# Patient Record
Sex: FEMALE | Race: BLACK OR AFRICAN AMERICAN | Employment: UNEMPLOYED | URBAN - METROPOLITAN AREA
[De-identification: names, ages, dates, MRNs, and addresses within clinical notes are randomized per-mention and may not be internally consistent; named-entity substitution may affect disease eponyms.]

---

## 2020-02-13 ENCOUNTER — OFFICE VISIT (OUTPATIENT)
Dept: FAMILY MEDICINE CLINIC | Age: 45
End: 2020-02-13
Payer: COMMERCIAL

## 2020-02-13 VITALS
SYSTOLIC BLOOD PRESSURE: 138 MMHG | HEIGHT: 62 IN | DIASTOLIC BLOOD PRESSURE: 88 MMHG | BODY MASS INDEX: 35.66 KG/M2 | WEIGHT: 193.8 LBS

## 2020-02-13 PROBLEM — I10 HYPERTENSION: Status: ACTIVE | Noted: 2020-02-13

## 2020-02-13 PROBLEM — K21.9 GERD (GASTROESOPHAGEAL REFLUX DISEASE): Status: ACTIVE | Noted: 2020-02-13

## 2020-02-13 PROCEDURE — 99204 OFFICE O/P NEW MOD 45 MIN: CPT | Performed by: FAMILY MEDICINE

## 2020-02-13 RX ORDER — PREDNISONE 1 MG/1
5 TABLET ORAL DAILY
COMMUNITY

## 2020-02-13 RX ORDER — DOXAZOSIN MESYLATE 1 MG/1
2 TABLET ORAL 2 TIMES DAILY
COMMUNITY

## 2020-02-13 RX ORDER — CLONIDINE HYDROCHLORIDE 0.1 MG/1
0.1 TABLET ORAL 2 TIMES DAILY
COMMUNITY

## 2020-02-13 RX ORDER — EVEROLIMUS TABLETS 0.5 MG/1
TABLET ORAL
COMMUNITY
End: 2021-08-06 | Stop reason: ALTCHOICE

## 2020-02-13 ASSESSMENT — PATIENT HEALTH QUESTIONNAIRE - PHQ9
SUM OF ALL RESPONSES TO PHQ9 QUESTIONS 1 & 2: 0
SUM OF ALL RESPONSES TO PHQ QUESTIONS 1-9: 0
1. LITTLE INTEREST OR PLEASURE IN DOING THINGS: 0
SUM OF ALL RESPONSES TO PHQ QUESTIONS 1-9: 0
2. FEELING DOWN, DEPRESSED OR HOPELESS: 0

## 2020-02-13 ASSESSMENT — ENCOUNTER SYMPTOMS
DIARRHEA: 0
VOMITING: 0
COUGH: 0
CHEST TIGHTNESS: 0
ABDOMINAL PAIN: 0
NAUSEA: 0
WHEEZING: 0
BACK PAIN: 0
SORE THROAT: 0
RHINORRHEA: 0
SINUS PRESSURE: 0
SHORTNESS OF BREATH: 0

## 2020-02-13 NOTE — PROGRESS NOTES
2020     Doc Pina (:  1975) is a 50 y.o. female, here for evaluation of the following medical concerns:    1. Encounter to [facility]- patient is doing well, wants to establish care with pcp, has had a primary care doctor in the past, follows with her Nephrologist who prescribes her medications. She is not needing refills today. 2.  HTN- santhosh today, but patient has chronic renal disease, she is generally high, denied syncope, dizziness, or headache. Taking her medications as prescribed. 3.  Chronic kidney disease- recent labs obtained by her Nephrologist show creatinine over 7? She was informed that if this doesn't improve she will be placed back on dialysis. She has an upcoming appointment, feels well today. 4.  S/P renal transplant- she underwent procedure in  and thus has always followed with her Nephrologist, was encouraged to obtain a PCP due to other issues that could arise. 5.  GERD- well controlled with medication, no side effects from the medication, stated that she has been on this for several years, had chest pain but resolved once she took the medication. Today, denied chest pain, sob, n, v, or diarrhea. HPI    Review of Systems   Constitutional: Positive for activity change and fatigue. Negative for fever and unexpected weight change. HENT: Negative for congestion, ear pain, mouth sores, rhinorrhea, sinus pressure and sore throat. Eyes: Negative for visual disturbance. Respiratory: Negative for cough, chest tightness, shortness of breath and wheezing. Cardiovascular: Negative for chest pain and leg swelling. Gastrointestinal: Negative for abdominal pain, diarrhea, nausea and vomiting. Endocrine: Negative for cold intolerance, heat intolerance, polydipsia and polyphagia. Genitourinary: Negative for dyspareunia, flank pain, frequency, hematuria and urgency. Musculoskeletal: Positive for arthralgias. Negative for back pain. Skin: Negative for rash. Neurological: Negative for dizziness, tremors, syncope, numbness and headaches. Psychiatric/Behavioral: Negative for dysphoric mood. The patient is not nervous/anxious. Prior to Visit Medications    Medication Sig Taking? Authorizing Provider   cloNIDine (CATAPRES) 0.1 MG tablet Take 0.1 mg by mouth 3 times daily Yes Historical Provider, MD   predniSONE (DELTASONE) 5 MG tablet Take 5 mg by mouth daily Yes Historical Provider, MD   Ergocalciferol (VITAMIN D2 PO) Take by mouth Yes Historical Provider, MD   doxazosin (CARDURA) 1 MG tablet Take 1 mg by mouth nightly Yes Historical Provider, MD   Everolimus (ZORTRESS) 0.5 MG TABS Take by mouth Yes Historical Provider, MD   tacrolimus (PROGRAF) 1 MG capsule Take 1 mg by mouth 2 times daily Indications: two in the AM and three at night Yes Historical Provider, MD   Magnesium Cl-Calcium Carbonate (SLOW-MAG PO) Take 1 tablet by mouth daily Yes Historical Provider, MD   Cyanocobalamin (VITAMIN B 12 PO) Take 1 tablet by mouth daily Yes Historical Provider, MD   vitamin D (CHOLECALCIFEROL) 1000 UNIT TABS tablet Take 1,000 Units by mouth once a week Indications: on thursday Yes Historical Provider, MD   METOPROLOL SUCCINATE ER PO Take 1 tablet by mouth 2 times daily Yes Historical Provider, MD   FAMOTIDINE PO Take 1 tablet by mouth 2 times daily Yes Historical Provider, MD   SODIUM BICARBONATE, ANTACID, PO Take 2 tablets by mouth nightly Yes Historical Provider, MD        Social History     Tobacco Use    Smoking status: Never Smoker    Smokeless tobacco: Never Used   Substance Use Topics    Alcohol use: No        Vitals:    02/13/20 1102 02/13/20 1133   BP: (!) 142/92 138/88   Weight: 193 lb 12.8 oz (87.9 kg)    Height: 5' 2\" (1.575 m)      Estimated body mass index is 35.45 kg/m² as calculated from the following:    Height as of this encounter: 5' 2\" (1.575 m). Weight as of this encounter: 193 lb 12.8 oz (87.9 kg).     Physical

## 2021-07-16 ENCOUNTER — INITIAL CONSULT (OUTPATIENT)
Dept: SURGERY | Age: 46
End: 2021-07-16
Payer: COMMERCIAL

## 2021-07-16 VITALS — SYSTOLIC BLOOD PRESSURE: 100 MMHG | BODY MASS INDEX: 32.74 KG/M2 | WEIGHT: 179 LBS | DIASTOLIC BLOOD PRESSURE: 79 MMHG

## 2021-07-16 DIAGNOSIS — N18.6 ESRD (END STAGE RENAL DISEASE) ON DIALYSIS (HCC): Primary | ICD-10-CM

## 2021-07-16 DIAGNOSIS — Z99.2 ESRD (END STAGE RENAL DISEASE) ON DIALYSIS (HCC): Primary | ICD-10-CM

## 2021-07-16 PROBLEM — H02.883 MEIBOMIAN GLAND DYSFUNCTION OF RIGHT EYE: Status: ACTIVE | Noted: 2019-05-13

## 2021-07-16 PROBLEM — H11.153 PINGUECULA, BILATERAL: Status: ACTIVE | Noted: 2020-05-01

## 2021-07-16 PROBLEM — N18.5 ANEMIA DUE TO STAGE 5 CHRONIC KIDNEY DISEASE, NOT ON CHRONIC DIALYSIS (HCC): Status: ACTIVE | Noted: 2018-09-17

## 2021-07-16 PROBLEM — Z98.890 S/P ENDOMETRIAL ABLATION: Status: ACTIVE | Noted: 2019-04-23

## 2021-07-16 PROBLEM — G40.909 NON-REFRACTORY EPILEPSY (HCC): Status: ACTIVE | Noted: 2017-09-19

## 2021-07-16 PROBLEM — Z98.41 STATUS POST CATARACT EXTRACTION AND INSERTION OF INTRAOCULAR LENS OF RIGHT EYE: Status: ACTIVE | Noted: 2020-05-01

## 2021-07-16 PROBLEM — Z96.1 PSEUDOPHAKIA: Status: ACTIVE | Noted: 2020-05-01

## 2021-07-16 PROBLEM — D63.1 ANEMIA DUE TO CHRONIC KIDNEY DISEASE, ON CHRONIC DIALYSIS (HCC): Status: ACTIVE | Noted: 2020-01-09

## 2021-07-16 PROBLEM — E55.9 VITAMIN D DEFICIENCY: Status: ACTIVE | Noted: 2019-07-14

## 2021-07-16 PROBLEM — D75.829 HEPARIN-INDUCED THROMBOCYTOPENIA: Status: ACTIVE | Noted: 2018-09-26

## 2021-07-16 PROBLEM — H04.163: Status: ACTIVE | Noted: 2020-05-01

## 2021-07-16 PROBLEM — D64.9 ANEMIA, UNSPECIFIED: Status: ACTIVE | Noted: 2020-01-09

## 2021-07-16 PROBLEM — D63.1 ANEMIA SECONDARY TO RENAL FAILURE: Status: ACTIVE | Noted: 2020-01-09

## 2021-07-16 PROBLEM — N87.9 CERVICAL DYSPLASIA: Status: ACTIVE | Noted: 2017-05-24

## 2021-07-16 PROBLEM — H25.013 CORTICAL AGE-RELATED CATARACT, BILATERAL: Status: ACTIVE | Noted: 2020-05-01

## 2021-07-16 PROBLEM — Z00.00 ROUTINE ADULT HEALTH MAINTENANCE: Status: ACTIVE | Noted: 2020-09-06

## 2021-07-16 PROBLEM — D63.1 ANEMIA DUE TO STAGE 5 CHRONIC KIDNEY DISEASE, NOT ON CHRONIC DIALYSIS (HCC): Status: ACTIVE | Noted: 2018-09-17

## 2021-07-16 PROBLEM — T82.9XXA COMPLICATION OF VASCULAR ACCESS FOR DIALYSIS: Status: ACTIVE | Noted: 2017-03-23

## 2021-07-16 PROBLEM — E78.5 HYPERLIPEMIA: Status: ACTIVE | Noted: 2020-09-06

## 2021-07-16 PROBLEM — I12.0 HYPERTENSIVE KIDNEY DISEASE WITH ESRD (END-STAGE RENAL DISEASE) (HCC): Status: ACTIVE | Noted: 2017-03-23

## 2021-07-16 PROBLEM — N17.9 ARF (ACUTE RENAL FAILURE) (HCC): Status: ACTIVE | Noted: 2018-10-22

## 2021-07-16 PROBLEM — Z96.1 PRESENCE OF INTRAOCULAR LENS: Status: ACTIVE | Noted: 2020-05-01

## 2021-07-16 PROBLEM — Z96.1 STATUS POST CATARACT EXTRACTION AND INSERTION OF INTRAOCULAR LENS OF RIGHT EYE: Status: ACTIVE | Noted: 2020-05-01

## 2021-07-16 PROBLEM — E66.01 SEVERE OBESITY (BMI 35.0-39.9) WITH COMORBIDITY (HCC): Status: ACTIVE | Noted: 2018-07-19

## 2021-07-16 PROBLEM — E87.20 ACIDOSIS: Status: ACTIVE | Noted: 2020-09-06

## 2021-07-16 PROBLEM — N18.9 ANEMIA SECONDARY TO RENAL FAILURE: Status: ACTIVE | Noted: 2020-01-09

## 2021-07-16 PROBLEM — H25.041 POSTERIOR SUBCAPSULAR POLAR AGE-RELATED CATARACT, RIGHT EYE: Status: ACTIVE | Noted: 2020-05-01

## 2021-07-16 PROCEDURE — G8427 DOCREV CUR MEDS BY ELIG CLIN: HCPCS | Performed by: SURGERY

## 2021-07-16 PROCEDURE — 99205 OFFICE O/P NEW HI 60 MIN: CPT | Performed by: SURGERY

## 2021-07-16 PROCEDURE — G8417 CALC BMI ABV UP PARAM F/U: HCPCS | Performed by: SURGERY

## 2021-07-16 ASSESSMENT — ENCOUNTER SYMPTOMS
RESPIRATORY NEGATIVE: 1
GASTROINTESTINAL NEGATIVE: 1
ALLERGIC/IMMUNOLOGIC NEGATIVE: 1
EYES NEGATIVE: 1

## 2021-07-16 NOTE — PROGRESS NOTES
normal.       Musculoskeletal:         General: Normal range of motion. Arms:       Cervical back: Normal range of motion. Neurological:      Mental Status: She is alert and oriented to person, place, and time. Deep Tendon Reflexes: Reflexes are normal and symmetric. Psychiatric:         Speech: Speech normal.         Behavior: Behavior normal.         Thought Content: Thought content normal.         Judgment: Judgment normal.       She has had two children, one living who is 21 her older child had autism and has since . She has had a kidney transplant. She has a family history of MS    ASSESSMENT:    Problem List Items Addressed This Visit     None      Visit Diagnoses     ESRD (end stage renal disease) on dialysis ([facility]    -  Primary          PLAN:  She definitely wants to stay with the right arm has not even had the left arm mapped as she is left-handed she got many challenges for access had a fistula at the wrist became aneurysmal and was ligated but by the scar suspected that it was superficial lysed at some time. As far as her arteries the brachial artery has a high bifurcation at the elbow the radial is 3.7 the ulnar 4.4 it is deeper, she has a normal Anthony test.  As far as her veins there is a clot in the basilic no forearm veins at all the cephalic vein is across his antecubital space is small but there is an accessory cephalic vein that is large 6 mm it joins the main cephalic vein but senior care up the arm and  goes on to connect to the axillary system. Dr Jocelyne Moralez has recommended skipping the AV fistula at the elbow and moving on to a graft ulnar artery at the elbow to mid or higher brachial or basilic.   We had a long discussion about preference to use all natural versus graft I think at dialysis they showed her a piece of graft,Dr Garcia's objection is that the accessory cephalic vein is very superficial, but I think if we try the fistula first do not burn any bridges where his AV go with an upper arm [place]-J[place] Manuel as her first operation will be moving to the left arm shortly. There are other challenges that she is heparin allergic with a history of HIT so I have to use argatroban, and that she is on 2-1/2 mg Eliquis twice a day which will have to stop a couple days before the operation. We will have to remind everyone no heparin flushes no heparin on the back table. I also explained to the patient that we will look ourselves with the ultrasound and if we see any problems interval changes we may switch to the [place]-Juan Manule  You will need a right arm accessory cephalic to antecubital ulnar artery fistula. Tentative surgery date is Thursday, August 12. You will need to have an history and physical done by your primary care doctor before surgery can be done. Surgery is outpatient, and you will be called before surgery with a time of arrival and time of surgery. Ricky German MA am scribing for and in the presence of Dolores Butler MD on this date of 07/16/21    I Michelle Ward MD personally performed the services described in this documentation as scribed by the Medical Assistant Breezy Thomas in my presence and it is both accurate and complete.         Electronically signed by Dolores Butler MD on 7/16/2021 at 11:57 AM

## 2021-07-16 NOTE — PATIENT INSTRUCTIONS
You will need a right arm accessory cephalic to antecubital ulnar artery fistula. Tentative surgery date is Thursday, August 12. You will need to have an history and physical done by your primary care doctor before surgery can be done. Surgery is outpatient, and you will be called before surgery with a time of arrival and time of surgery.

## 2021-08-02 ENCOUNTER — TELEPHONE (OUTPATIENT)
Dept: SURGERY | Age: 46
End: 2021-08-02

## 2021-08-02 NOTE — TELEPHONE ENCOUNTER
Spoke with patient regarding scheduled surgery on 08- with Dr. Nazario Acuña. Patient is asked to arrive by 6:00 AM @ [facility] after midnight. May take any Heart or Blood Pressure medication with a small sip of water to wash them down. You will need someone to drive you home following this procedure. Please bring a Photo ID & Insurance card with you, and check-in at the Surgery Desk down the right-hand hallway on the first floor. Patient has been asked to see PCP (Or NP @ Dialysis)  for Pre-op H & P.  Surgery is scheduled to start at approx. 7:30 AM and should take approx. 120 minutes. If the hospital needs any further information, someone will give you a call. Patient expressed a verbal understanding of these instructions and had no further questions at this time. Mailed instruction letter. Call ended.

## 2021-08-06 RX ORDER — ACETAMINOPHEN 325 MG/1
650 TABLET ORAL EVERY 6 HOURS PRN
COMMUNITY

## 2021-08-06 NOTE — PROGRESS NOTES
nail polish on your fingers or toes      For your safety, please do not wear any jewelry or body piercing's on the day of surgery. All jewelry must be removed. If you have dentures, they will be removed before going to operating room. For your convenience, we will provide you with a container. If you wear contact lenses or glasses, they will be removed, please bring a case for them. If you have a living will and a durable power of  for healthcare, please bring in a copy. As part of our patient safety program to minimize surgical site infections, we ask you to do the following:    · Please notify your surgeon if you develop any illness between         now and the  day of your surgery. · This includes a cough, cold, fever, sore throat, nausea,         or vomiting, and diarrhea, etc.  ·  Please notify your surgeon if you experience dizziness, shortness         of breath or blurred vision between now and the time of your surgery. Do not shave your operative site 96 hours prior to surgery. For face and neck surgery, men may use an electric razor 48 hours   prior to surgery. You may shower the night before surgery or the morning of   your surgery with an antibacterial soap. You will need to bring a photo ID and insurance card    [facility] has an onsite pharmacy, would you like to utilize our pharmacy     If you will be staying overnight and use a C-pap machine, please bring   your C-pap to hospital     Our goal is to provide you with excellent care, therefore, visitors will be limited to two(2) in the room at a time so that we may focus on providing this care for you. Please contact pre-admission testing if you have any further questions.                  [facility] phone number:  [phone] PAT fax number:  [phone]  Please note these are generalized instructions for all surgical cases, you may be provided with more specific instructions according to your surgery.

## 2021-08-06 NOTE — PROGRESS NOTES
Preoperative Screening for Elective Surgery/Invasive Procedures While COVID-19 present in the community     Have you tested positive or have been told to self-isolate for COVID-19 like symptoms within the past 28 days? NO   Do you currently have any of the following symptoms? o Fever >100.0 F or 99.9 F in immunocompromised patients? NO  o New onset cough, shortness of breath or difficulty breathing? NO  o New onset sore throat, myalgia (muscle aches and pains), headache, loss of taste/smell or diarrhea? NO   Have you had a potential exposure to COVID-19 within the past 14 days by:  o Close contact with a confirmed case? NO  o Close contact with a healthcare worker,  or essential infrastructure worker (grocery store, [address] [place], [place] [address]way,[address], gas station, public utilities or transportation)? YES  o Do you reside in a congregate setting such as; skilled nursing facility, adult home, correctional facility, homeless shelter or other institutional setting? NO  o Have you had recent travel to a known COVID-19 hotspot? NO    Indicate if the patient has a positive screen by answering yes to one or more of the above questions. Patients who test positive or screen positive prior to surgery or on the day of surgery should be evaluated in conjunction with the surgeon/proceduralist/anesthesiologist to determine the urgency of the procedure.

## 2021-08-11 ENCOUNTER — ANESTHESIA EVENT (OUTPATIENT)
Dept: OPERATING ROOM | Age: 46
End: 2021-08-11
Payer: COMMERCIAL

## 2021-08-12 ENCOUNTER — ANESTHESIA (OUTPATIENT)
Dept: OPERATING ROOM | Age: 46
End: 2021-08-12
Payer: COMMERCIAL

## 2021-08-12 ENCOUNTER — HOSPITAL ENCOUNTER (OUTPATIENT)
Age: 46
Setting detail: OUTPATIENT SURGERY
Discharge: HOME | End: 2021-08-12
Attending: SURGERY | Admitting: SURGERY
Payer: COMMERCIAL

## 2021-08-12 VITALS
RESPIRATION RATE: 16 BRPM | WEIGHT: 181.22 LBS | HEART RATE: 59 BPM | TEMPERATURE: 97.8 F | SYSTOLIC BLOOD PRESSURE: 142 MMHG | HEIGHT: 63 IN | DIASTOLIC BLOOD PRESSURE: 93 MMHG | BODY MASS INDEX: 32.11 KG/M2 | OXYGEN SATURATION: 96 %

## 2021-08-12 VITALS
DIASTOLIC BLOOD PRESSURE: 81 MMHG | TEMPERATURE: 96.1 F | RESPIRATION RATE: 2 BRPM | SYSTOLIC BLOOD PRESSURE: 126 MMHG | OXYGEN SATURATION: 100 %

## 2021-08-12 DIAGNOSIS — G89.18 ACUTE POST-OPERATIVE PAIN: ICD-10-CM

## 2021-08-12 DIAGNOSIS — N18.5 CKD (CHRONIC KIDNEY DISEASE), STAGE V (HCC): Primary | ICD-10-CM

## 2021-08-12 LAB
BASOPHILS ABSOLUTE: 0 K/UL (ref 0–0.2)
BASOPHILS RELATIVE PERCENT: 0.6 %
EOSINOPHILS ABSOLUTE: 0.1 K/UL (ref 0–0.6)
EOSINOPHILS RELATIVE PERCENT: 2 %
HCT VFR BLD CALC: 36.7 % (ref 36–48)
HEMOGLOBIN: 11.8 G/DL (ref 12–16)
LYMPHOCYTES ABSOLUTE: 1.7 K/UL (ref 1–5.1)
LYMPHOCYTES RELATIVE PERCENT: 31 %
MCH RBC QN AUTO: 31.6 PG (ref 26–34)
MCHC RBC AUTO-ENTMCNC: 32.2 G/DL (ref 31–36)
MCV RBC AUTO: 98.1 FL (ref 80–100)
MONOCYTES ABSOLUTE: 0.6 K/UL (ref 0–1.3)
MONOCYTES RELATIVE PERCENT: 11.6 %
NEUTROPHILS ABSOLUTE: 3 K/UL (ref 1.7–7.7)
NEUTROPHILS RELATIVE PERCENT: 54.8 %
PDW BLD-RTO: 16.7 % (ref 12.4–15.4)
PLATELET # BLD: 122 K/UL (ref 135–450)
PMV BLD AUTO: 8.4 FL (ref 5–10.5)
POTASSIUM, WHOLE BLOOD: 5.5 MMOL/L (ref 3.5–5.1)
RBC # BLD: 3.74 M/UL (ref 4–5.2)
WBC # BLD: 5.5 K/UL (ref 4–11)

## 2021-08-12 PROCEDURE — 7100000010 HC PHASE II RECOVERY - FIRST 15 MIN: Performed by: SURGERY

## 2021-08-12 PROCEDURE — 2500000003 HC RX 250 WO HCPCS: Performed by: SURGERY

## 2021-08-12 PROCEDURE — 84132 ASSAY OF SERUM POTASSIUM: CPT

## 2021-08-12 PROCEDURE — 85025 COMPLETE CBC W/AUTO DIFF WBC: CPT

## 2021-08-12 PROCEDURE — 6370000000 HC RX 637 (ALT 250 FOR IP): Performed by: ANESTHESIOLOGY

## 2021-08-12 PROCEDURE — 3600000014 HC SURGERY LEVEL 4 ADDTL 15MIN: Performed by: SURGERY

## 2021-08-12 PROCEDURE — 2500000003 HC RX 250 WO HCPCS: Performed by: NURSE ANESTHETIST, CERTIFIED REGISTERED

## 2021-08-12 PROCEDURE — 6360000002 HC RX W HCPCS: Performed by: NURSE ANESTHETIST, CERTIFIED REGISTERED

## 2021-08-12 PROCEDURE — 2580000003 HC RX 258: Performed by: ANESTHESIOLOGY

## 2021-08-12 PROCEDURE — 2709999900 HC NON-CHARGEABLE SUPPLY: Performed by: SURGERY

## 2021-08-12 PROCEDURE — 2780000010 HC IMPLANT OTHER: Performed by: SURGERY

## 2021-08-12 PROCEDURE — 3700000000 HC ANESTHESIA ATTENDED CARE: Performed by: SURGERY

## 2021-08-12 PROCEDURE — 3600000004 HC SURGERY LEVEL 4 BASE: Performed by: SURGERY

## 2021-08-12 PROCEDURE — 2720000010 HC SURG SUPPLY STERILE: Performed by: SURGERY

## 2021-08-12 PROCEDURE — 7100000001 HC PACU RECOVERY - ADDTL 15 MIN: Performed by: SURGERY

## 2021-08-12 PROCEDURE — 6360000002 HC RX W HCPCS: Performed by: ANESTHESIOLOGY

## 2021-08-12 PROCEDURE — 2580000003 HC RX 258: Performed by: SURGERY

## 2021-08-12 PROCEDURE — 3700000001 HC ADD 15 MINUTES (ANESTHESIA): Performed by: SURGERY

## 2021-08-12 PROCEDURE — 7100000000 HC PACU RECOVERY - FIRST 15 MIN: Performed by: SURGERY

## 2021-08-12 PROCEDURE — 36825 ARTERY-VEIN AUTOGRAFT: CPT | Performed by: SURGERY

## 2021-08-12 PROCEDURE — 7100000011 HC PHASE II RECOVERY - ADDTL 15 MIN: Performed by: SURGERY

## 2021-08-12 RX ORDER — SODIUM CHLORIDE 0.9 % (FLUSH) 0.9 %
5-40 SYRINGE (ML) INJECTION PRN
Status: DISCONTINUED | OUTPATIENT
Start: 2021-08-12 | End: 2021-08-12 | Stop reason: HOSPADM

## 2021-08-12 RX ORDER — FENTANYL CITRATE 50 UG/ML
25 INJECTION, SOLUTION INTRAMUSCULAR; INTRAVENOUS EVERY 5 MIN PRN
Status: DISCONTINUED | OUTPATIENT
Start: 2021-08-12 | End: 2021-08-12 | Stop reason: HOSPADM

## 2021-08-12 RX ORDER — MAGNESIUM HYDROXIDE 1200 MG/15ML
LIQUID ORAL CONTINUOUS PRN
Status: COMPLETED | OUTPATIENT
Start: 2021-08-12 | End: 2021-08-12

## 2021-08-12 RX ORDER — SODIUM CHLORIDE 0.9 % (FLUSH) 0.9 %
5-40 SYRINGE (ML) INJECTION EVERY 12 HOURS SCHEDULED
Status: DISCONTINUED | OUTPATIENT
Start: 2021-08-12 | End: 2021-08-12 | Stop reason: HOSPADM

## 2021-08-12 RX ORDER — PROPOFOL 10 MG/ML
INJECTION, EMULSION INTRAVENOUS PRN
Status: DISCONTINUED | OUTPATIENT
Start: 2021-08-12 | End: 2021-08-12 | Stop reason: SDUPTHER

## 2021-08-12 RX ORDER — ONDANSETRON 2 MG/ML
4 INJECTION INTRAMUSCULAR; INTRAVENOUS
Status: DISCONTINUED | OUTPATIENT
Start: 2021-08-12 | End: 2021-08-12 | Stop reason: HOSPADM

## 2021-08-12 RX ORDER — CLINDAMYCIN PHOSPHATE 900 MG/50ML
900 INJECTION INTRAVENOUS ONCE
Status: COMPLETED | OUTPATIENT
Start: 2021-08-12 | End: 2021-08-12

## 2021-08-12 RX ORDER — ARGATROBAN 1 MG/ML
5 INJECTION, SOLUTION INTRAVENOUS ONCE
Status: COMPLETED | OUTPATIENT
Start: 2021-08-12 | End: 2021-08-12

## 2021-08-12 RX ORDER — FENTANYL CITRATE 50 UG/ML
50 INJECTION, SOLUTION INTRAMUSCULAR; INTRAVENOUS EVERY 5 MIN PRN
Status: DISCONTINUED | OUTPATIENT
Start: 2021-08-12 | End: 2021-08-12 | Stop reason: HOSPADM

## 2021-08-12 RX ORDER — OXYCODONE HYDROCHLORIDE 10 MG/1
10 TABLET ORAL PRN
Status: COMPLETED | OUTPATIENT
Start: 2021-08-12 | End: 2021-08-12

## 2021-08-12 RX ORDER — DEXAMETHASONE SODIUM PHOSPHATE 4 MG/ML
INJECTION, SOLUTION INTRA-ARTICULAR; INTRALESIONAL; INTRAMUSCULAR; INTRAVENOUS; SOFT TISSUE PRN
Status: DISCONTINUED | OUTPATIENT
Start: 2021-08-12 | End: 2021-08-12 | Stop reason: SDUPTHER

## 2021-08-12 RX ORDER — MIDAZOLAM HYDROCHLORIDE 1 MG/ML
INJECTION INTRAMUSCULAR; INTRAVENOUS PRN
Status: DISCONTINUED | OUTPATIENT
Start: 2021-08-12 | End: 2021-08-12 | Stop reason: SDUPTHER

## 2021-08-12 RX ORDER — MORPHINE SULFATE 2 MG/ML
1 INJECTION, SOLUTION INTRAMUSCULAR; INTRAVENOUS EVERY 5 MIN PRN
Status: DISCONTINUED | OUTPATIENT
Start: 2021-08-12 | End: 2021-08-12 | Stop reason: HOSPADM

## 2021-08-12 RX ORDER — LIDOCAINE HYDROCHLORIDE 20 MG/ML
INJECTION, SOLUTION EPIDURAL; INFILTRATION; INTRACAUDAL; PERINEURAL PRN
Status: DISCONTINUED | OUTPATIENT
Start: 2021-08-12 | End: 2021-08-12 | Stop reason: SDUPTHER

## 2021-08-12 RX ORDER — BUPIVACAINE HYDROCHLORIDE 5 MG/ML
INJECTION, SOLUTION EPIDURAL; INTRACAUDAL
Status: COMPLETED | OUTPATIENT
Start: 2021-08-12 | End: 2021-08-12

## 2021-08-12 RX ORDER — OXYCODONE HYDROCHLORIDE 5 MG/1
5 TABLET ORAL PRN
Status: COMPLETED | OUTPATIENT
Start: 2021-08-12 | End: 2021-08-12

## 2021-08-12 RX ORDER — SODIUM CHLORIDE 9 MG/ML
25 INJECTION, SOLUTION INTRAVENOUS PRN
Status: DISCONTINUED | OUTPATIENT
Start: 2021-08-12 | End: 2021-08-12 | Stop reason: HOSPADM

## 2021-08-12 RX ORDER — MEPERIDINE HYDROCHLORIDE 25 MG/ML
12.5 INJECTION INTRAMUSCULAR; INTRAVENOUS; SUBCUTANEOUS EVERY 5 MIN PRN
Status: DISCONTINUED | OUTPATIENT
Start: 2021-08-12 | End: 2021-08-12 | Stop reason: HOSPADM

## 2021-08-12 RX ORDER — MORPHINE SULFATE 2 MG/ML
2 INJECTION, SOLUTION INTRAMUSCULAR; INTRAVENOUS EVERY 5 MIN PRN
Status: DISCONTINUED | OUTPATIENT
Start: 2021-08-12 | End: 2021-08-12 | Stop reason: HOSPADM

## 2021-08-12 RX ORDER — FENTANYL CITRATE 50 UG/ML
INJECTION, SOLUTION INTRAMUSCULAR; INTRAVENOUS PRN
Status: DISCONTINUED | OUTPATIENT
Start: 2021-08-12 | End: 2021-08-12 | Stop reason: SDUPTHER

## 2021-08-12 RX ORDER — OXYCODONE HYDROCHLORIDE AND ACETAMINOPHEN 5; 325 MG/1; MG/1
1 TABLET ORAL EVERY 6 HOURS PRN
Qty: 28 TABLET | Refills: 0 | Status: SHIPPED | OUTPATIENT
Start: 2021-08-12 | End: 2021-08-19

## 2021-08-12 RX ADMIN — FENTANYL CITRATE 25 MCG: 50 INJECTION INTRAMUSCULAR; INTRAVENOUS at 08:35

## 2021-08-12 RX ADMIN — FENTANYL CITRATE 25 MCG: 50 INJECTION INTRAMUSCULAR; INTRAVENOUS at 08:04

## 2021-08-12 RX ADMIN — PROPOFOL 180 MG: 10 INJECTION, EMULSION INTRAVENOUS at 07:37

## 2021-08-12 RX ADMIN — CLINDAMYCIN PHOSPHATE 900 MG: 18 INJECTION, SOLUTION INTRAMUSCULAR; INTRAVENOUS at 07:30

## 2021-08-12 RX ADMIN — HYDROMORPHONE HYDROCHLORIDE 0.25 MG: 1 INJECTION, SOLUTION INTRAMUSCULAR; INTRAVENOUS; SUBCUTANEOUS at 09:17

## 2021-08-12 RX ADMIN — ARGATROBAN 5 MG: 1 INJECTION INTRAVENOUS at 08:30

## 2021-08-12 RX ADMIN — FENTANYL CITRATE 25 MCG: 50 INJECTION INTRAMUSCULAR; INTRAVENOUS at 08:44

## 2021-08-12 RX ADMIN — OXYCODONE HYDROCHLORIDE 5 MG: 5 TABLET ORAL at 11:34

## 2021-08-12 RX ADMIN — LIDOCAINE HYDROCHLORIDE 80 MG: 20 INJECTION, SOLUTION EPIDURAL; INFILTRATION; INTRACAUDAL; PERINEURAL at 07:37

## 2021-08-12 RX ADMIN — DEXAMETHASONE SODIUM PHOSPHATE 10 MG: 4 INJECTION, SOLUTION INTRAMUSCULAR; INTRAVENOUS at 08:04

## 2021-08-12 RX ADMIN — FENTANYL CITRATE 25 MCG: 50 INJECTION INTRAMUSCULAR; INTRAVENOUS at 08:53

## 2021-08-12 RX ADMIN — SODIUM CHLORIDE 25 ML: 9 INJECTION, SOLUTION INTRAVENOUS at 06:59

## 2021-08-12 RX ADMIN — MIDAZOLAM 2 MG: 1 INJECTION INTRAMUSCULAR; INTRAVENOUS at 07:30

## 2021-08-12 ASSESSMENT — PULMONARY FUNCTION TESTS
PIF_VALUE: 15
PIF_VALUE: 3
PIF_VALUE: 15
PIF_VALUE: 4
PIF_VALUE: 8
PIF_VALUE: 21
PIF_VALUE: 3
PIF_VALUE: 4
PIF_VALUE: 11
PIF_VALUE: 3
PIF_VALUE: 4
PIF_VALUE: 15
PIF_VALUE: 4
PIF_VALUE: 4
PIF_VALUE: 15
PIF_VALUE: 5
PIF_VALUE: 15
PIF_VALUE: 4
PIF_VALUE: 4
PIF_VALUE: 3
PIF_VALUE: 4
PIF_VALUE: 4
PIF_VALUE: 15
PIF_VALUE: 4
PIF_VALUE: 4
PIF_VALUE: 15
PIF_VALUE: 5
PIF_VALUE: 4
PIF_VALUE: 4
PIF_VALUE: 3
PIF_VALUE: 3
PIF_VALUE: 4
PIF_VALUE: 3
PIF_VALUE: 4
PIF_VALUE: 3
PIF_VALUE: 15
PIF_VALUE: 3
PIF_VALUE: 15
PIF_VALUE: 15
PIF_VALUE: 4
PIF_VALUE: 15
PIF_VALUE: 3
PIF_VALUE: 4
PIF_VALUE: 15
PIF_VALUE: 15
PIF_VALUE: 11
PIF_VALUE: 4
PIF_VALUE: 4
PIF_VALUE: 5
PIF_VALUE: 15
PIF_VALUE: 6
PIF_VALUE: 4
PIF_VALUE: 3
PIF_VALUE: 7
PIF_VALUE: 3
PIF_VALUE: 4
PIF_VALUE: 3
PIF_VALUE: 4
PIF_VALUE: 4
PIF_VALUE: 3
PIF_VALUE: 3
PIF_VALUE: 4
PIF_VALUE: 3
PIF_VALUE: 15
PIF_VALUE: 1
PIF_VALUE: 15
PIF_VALUE: 3
PIF_VALUE: 3
PIF_VALUE: 5
PIF_VALUE: 4
PIF_VALUE: 1
PIF_VALUE: 4
PIF_VALUE: 4
PIF_VALUE: 2
PIF_VALUE: 3
PIF_VALUE: 4
PIF_VALUE: 4
PIF_VALUE: 18
PIF_VALUE: 3
PIF_VALUE: 0
PIF_VALUE: 4
PIF_VALUE: 2
PIF_VALUE: 3
PIF_VALUE: 3
PIF_VALUE: 4
PIF_VALUE: 15
PIF_VALUE: 2
PIF_VALUE: 4
PIF_VALUE: 3
PIF_VALUE: 5
PIF_VALUE: 4
PIF_VALUE: 3
PIF_VALUE: 3
PIF_VALUE: 1
PIF_VALUE: 11
PIF_VALUE: 3
PIF_VALUE: 15
PIF_VALUE: 3
PIF_VALUE: 4
PIF_VALUE: 3
PIF_VALUE: 3
PIF_VALUE: 4
PIF_VALUE: 15
PIF_VALUE: 2
PIF_VALUE: 3
PIF_VALUE: 4
PIF_VALUE: 4
PIF_VALUE: 2
PIF_VALUE: 4
PIF_VALUE: 15

## 2021-08-12 ASSESSMENT — PAIN SCALES - GENERAL
PAINLEVEL_OUTOF10: 0
PAINLEVEL_OUTOF10: 3
PAINLEVEL_OUTOF10: 5
PAINLEVEL_OUTOF10: 5

## 2021-08-12 ASSESSMENT — PAIN DESCRIPTION - DESCRIPTORS
DESCRIPTORS: DISCOMFORT
DESCRIPTORS: ACHING
DESCRIPTORS: DISCOMFORT

## 2021-08-12 ASSESSMENT — PAIN DESCRIPTION - FREQUENCY
FREQUENCY: CONTINUOUS

## 2021-08-12 ASSESSMENT — PAIN - FUNCTIONAL ASSESSMENT
PAIN_FUNCTIONAL_ASSESSMENT: ACTIVITIES ARE NOT PREVENTED
PAIN_FUNCTIONAL_ASSESSMENT: 0-10
PAIN_FUNCTIONAL_ASSESSMENT: ACTIVITIES ARE NOT PREVENTED
PAIN_FUNCTIONAL_ASSESSMENT: PREVENTS OR INTERFERES SOME ACTIVE ACTIVITIES AND ADLS

## 2021-08-12 ASSESSMENT — PAIN DESCRIPTION - PAIN TYPE
TYPE: SURGICAL PAIN

## 2021-08-12 ASSESSMENT — PAIN DESCRIPTION - ORIENTATION
ORIENTATION: RIGHT

## 2021-08-12 ASSESSMENT — PAIN DESCRIPTION - ONSET
ONSET: ON-GOING

## 2021-08-12 ASSESSMENT — PAIN DESCRIPTION - LOCATION
LOCATION: ARM

## 2021-08-12 ASSESSMENT — PAIN DESCRIPTION - PROGRESSION
CLINICAL_PROGRESSION: GRADUALLY IMPROVING
CLINICAL_PROGRESSION: GRADUALLY IMPROVING
CLINICAL_PROGRESSION: GRADUALLY WORSENING

## 2021-08-12 NOTE — PROGRESS NOTES
CLINICAL PHARMACY NOTE: MEDS TO BEDS    Total # of Prescriptions Filled: 1   The following medications were delivered to the patient:  Current Discharge Medication List      START taking these medications    Details   oxyCODONE-acetaminophen (PERCOCET) 5-325 MG per tablet Take 1 tablet by mouth every 6 hours as needed for Pain for up to 7 days. Intended supply: 7 days. Take lowest dose possible to manage pain  Qty: 28 tablet, Refills: 0    Comments: Reduce doses taken as pain becomes manageable  Associated Diagnoses: CKD (chronic kidney disease), stage V ([facility]  Acute post-operative pain         ·   ·     Additional Documentation:

## 2021-08-12 NOTE — BRIEF OP NOTE
Brief Postoperative Note      Patient: Oksana Gallo  YOB: 1975  MRN: [id number]    Date of Procedure: 8/12/2021    Pre-Op Diagnosis: END STAGE RENAL DISEASE    Post-Op Diagnosis: Same       Procedure(s):  RIGHT ARM ACCESSORY CEPHALIC TO ANTECUBITAL ULNAR ARTERY FISTULA    Surgeon(s):  Ann Bernstein MD    Assistant:  First Assistant: Mian Willingham    Anesthesia: Choice    Estimated Blood Loss (mL): 25    Complications: None    Specimens:   * No specimens in log *    Implants:  * No implants in log *      Drains: * No LDAs found *    Findings:     Electronically signed by Ann Bernstein MD on 8/12/2021 at 9:26 AM

## 2021-08-12 NOTE — H&P
Agree with H&P from Outpt notes, no changes noted . I have presented reasonable alternatives to the patient's proposed care, treatment, and services. The discussion I have done encompassed risks, benefits, and side effects related to the alternatives and the risks related to not receiving the proposed care, treatment, and services. All questions answered. Patient wishes to proceed.  Right arm marked for fistula

## 2021-08-12 NOTE — PROGRESS NOTES
patient has been sleeping but is slowly waking up now. Vss. Slight bruit heard, remains the same. Fingers are warm, move well, valentina well. Dressing and right arm appear the same.  Her transportation person is on the way back to the hospital.

## 2021-08-12 NOTE — ANESTHESIA PRE PROCEDURE
Department of Anesthesiology  Preprocedure Note       Name:  Janice Sibley   Age:  48 y.o.  :  1975                                          MRN:  [id number]         Date:  2021      Surgeon: Ana Roberts):  Itzel Serrano MD    Procedure: Procedure(s):  RIGHT ARM ACCESSORY CEPHALIC TO ANTECUBITAL ULNAR ARTERY FISTULA    Medications prior to admission:   Prior to Admission medications    Medication Sig Start Date End Date Taking?  Authorizing Provider   acetaminophen (TYLENOL) 325 MG tablet Take 650 mg by mouth every 6 hours as needed for Pain   Yes Historical Provider, MD   B Complex-C-Folic Acid (RENAL VITAMIN PO) Take 1 capsule by mouth three times a week With dialysis   Yes Historical Provider, MD   Apixaban (ELIQUIS PO) Take by mouth   Yes Historical Provider, MD   cloNIDine (CATAPRES) 0.1 MG tablet Take 0.1 mg by mouth as needed    Yes Historical Provider, MD   predniSONE (DELTASONE) 5 MG tablet Take 5 mg by mouth daily   Yes Historical Provider, MD   doxazosin (CARDURA) 1 MG tablet Take 1 mg by mouth 2 times daily    Yes Historical Provider, MD   tacrolimus (PROGRAF) 1 MG capsule Take 1 mg by mouth 2 times daily Indications: two in the AM and three at night   Yes Historical Provider, MD   Cyanocobalamin (VITAMIN B 12 PO) Take 1 tablet by mouth daily   Yes Historical Provider, MD   vitamin D (CHOLECALCIFEROL) 1000 UNIT TABS tablet Take 1,000 Units by mouth once a week Indications: on thursday   Yes Historical Provider, MD   METOPROLOL SUCCINATE ER PO Take 1 tablet by mouth 2 times daily   Yes Historical Provider, MD   FAMOTIDINE PO Take 1 tablet by mouth 2 times daily   Yes Historical Provider, MD       Current medications:    Current Facility-Administered Medications   Medication Dose Route Frequency Provider Last Rate Last Admin    sodium chloride flush 0.9 % injection 5-40 mL  5-40 mL Intravenous 2 times per day Ale Pacheco MD        sodium chloride flush 0.9 % injection 5-40 mL  5-40 mL Intravenous PRN Amadeo Horowitz MD        0.9 % sodium chloride infusion  25 mL Intravenous PRN Amadeo Horowitz MD        clindamycin (CLEOCIN) 900 mg in dextrose 5 % 50 mL IVPB  900 mg Intravenous Once Guera Means MD           Allergies: Allergies   Allergen Reactions    Heparin Other (See Comments)     States it clots her blood    Iodine Itching    Penicillins Itching       Problem List:    Patient Active Problem List   Diagnosis Code    Hypertension I10    GERD (gastroesophageal reflux disease) K21.9    Vitamin D deficiency E55.9    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    Pseudophakia Z96.1    Presence of intraocular lens Z96.1    Posterior subcapsular polar age-related cataract, right eye H25.041    Pinguecula, bilateral H11.153    Other and unspecified ovarian cyst N83.209    Body mass index (BMI) of 32.0-32.9 in adult Z68.32    Non-refractory epilepsy ([facility] G40.909    Meibomian gland dysfunction of right eye H02.883    Lacrimal gland dislocation, bilateral lacrimal glands H04.163    Hypertensive kidney disease with ESRD (end-stage renal disease) ([facility]) I12.0, N18.6    Hypertensive kidney disease with chronic kidney disease stage III ([facility]) I12.9, N18.30    Hyperparathyroidism due to renal insufficiency ([facility]) N25.81    Hyperlipemia E78.5    S/P endometrial ablation Z98.890    H/O tubal ligation Z98.51    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 CBC: No results found for: WBC, RBC, HGB, HCT, MCV, RDW, PLT    CMP: No results found for: NA, K, CL, CO2, BUN, CREATININE, GFRAA, AGRATIO, LABGLOM, GLUCOSE, PROT, CALCIUM, BILITOT, ALKPHOS, AST, ALT    POC Tests: No results for input(s): POCGLU, POCNA, POCK, POCCL, POCBUN, POCHEMO, POCHCT in the last 72 hours. Coags: No results found for: PROTIME, INR, APTT    HCG (If Applicable): No results found for: PREGTESTUR, PREGSERUM, HCG, HCGQUANT     ABGs: No results found for: PHART, PO2ART, VOB2OIE, NEM9GBU, BEART, L3DHVQPQ     Type & Screen (If Applicable):  No results found for: LABABO, LABRH    Drug/Infectious Status (If Applicable):  No results found for: HIV, HEPCAB    COVID-19 Screening (If Applicable): No results found for: COVID19        Anesthesia Evaluation  Patient summary reviewed and Nursing notes reviewed no history of anesthetic complications:   Airway: Mallampati: II  TM distance: >3 FB   Neck ROM: full  Mouth opening: > = 3 FB Dental:          Pulmonary:Negative Pulmonary ROS                              Cardiovascular:  Exercise tolerance: good (>4 METS),   (+) hypertension:,     (-) pacemaker, valvular problems/murmurs, past MI, CAD, CABG/stent, dysrhythmias,  angina,  CHF, orthopnea, PND,  TALBERT, no pulmonary hypertension and no hyperlipidemia      Rhythm: regular                      Neuro/Psych:   Negative Neuro/Psych ROS              GI/Hepatic/Renal:   (+) GERD:, renal disease (s/p kidney transplant, now the transplant is failing and needs to resume dialysis): dialysis and ESRD, bowel prep,      (-) hiatal hernia, PUD, hepatitis, liver disease and no morbid obesity       Endo/Other:    (+) blood dyscrasia: anemia and thrombocytopenia:., no malignancy/cancer.     (-) diabetes mellitus, hypothyroidism, hyperthyroidism, arthritis, no electrolyte abnormalities, no malignancy/cancer                ROS comment: HIT Abdominal:             Vascular: negative vascular ROS. Other Findings:           Anesthesia Plan      general     ASA 3       Induction: intravenous. MIPS: Prophylactic antiemetics administered. Anesthetic plan and risks discussed with patient. Plan discussed with CRNA. Sushila Hassan MD   8/12/2021        This pre-anesthesia assessment may be used as a history and physical.    DOS STAFF ADDENDUM:    Pt seen and examined, chart reviewed (including anesthesia, drug and allergy history). No interval changes to history and physical examination. Anesthetic plan, risks, benefits, alternatives, and personnel involved discussed with patient. Patient verbalized an understanding and agrees to proceed.       Sushila Hassan MD  August 12, 2021  6:48 AM

## 2021-08-12 NOTE — OP NOTE
[address]                                OPERATIVE REPORT    PATIENT NAME: Stefani Arriaza                    :        1975  MED REC NO:   [id number]                          ROOM:  ACCOUNT NO:   [de-identified]                           ADMIT DATE: 2021  PROVIDER:     Sebastián Barnes MD    DATE OF PROCEDURE:  2021    PREOPERATIVE DIAGNOSIS:  Renal patient with no long-term access. POSTOPERATIVE DIAGNOSIS:  Renal patient with no long-term access. OPERATION PERFORMED:  Creation of a right arm fistula from the accessory  cephalic vein to the ulnar artery at the elbow. SURGEON:  Tammy Ireland. Jesse Drew MD    ASSISTANT:  Laurita Townsend    ANESTHESIA:  General by LMA and Marcaine. ESTIMATED BLOOD LOSS:  25 mL. INDICATIONS:  This is a very pleasant 67-year-old black female, born  1975, patient of Dr. 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 It looks like  it might have been superficialized along the way. Any case, it is  currently clotted. She had a mapping with Dr. Lillie Tijerina at the access  center and these films were reviewed and his opinion greatly valued as  far as his recommendations. Anyway, her main problem on top of her  renal failure is that she has HIT and allergic to HEPARIN and her renal  doctor is Dr. Kylee Quach. OPERATIVE PROCEDURE:  The patient was placed on the table and a  tourniquet applied to the upper arm and again, the films were viewed  again last night. She has an accessory cephalic that is large up to 6  mm, but it is off very superficial and it is fairly far lateral on the  arm. She has a bifurcation of her brachial artery very high in her arm. So, at the elbow, she has both the radial and ulnar visible.   The radial  is closer to the vein, but the ulnar was measured as bigger, so we  planned to do the ulnar artery at this level. So, we made a generous incision knowing that we would have to get distal  on the cephalic vein to make it reach this far over to the ulnar artery  and we would have to undermine the upper flaps, so that the vein would  have a path to lay in a straight line and not be kinked. So,  infiltrated with local, made an incision, carried down through the skin  and subcutaneous fatty tissue, identified the very superficial cephalic  vein as described by Dr. Yasir Massey, dissected this proximally and distally,  ligated tributaries, and then left it alone. I then went medial and  opened the fascia. Actually, we could see and feel the radial artery,  which was more lateral and then dissected out the ulnar artery, which  was deeper and more medial.  So, we got proximal and distal control. In the meantime, we gave 5 mg of argatroban and before we clamped, we  waited a full ten and a half minutes to make sure that it had time to  work and it seemed to work well. We divided the vein with a clip  distally and then swung it over. We had marked it, so it would not  twist.  We flushed it with saline, and then we clamped the artery with a  vessel loop surrounded, but we clamped it with DeBakey clamps proximally  and distally, opened the artery on the lateral side, and then did our  anastomosis with 6-0 Prolene. We parachuted the heel, then the needle  popped off one of the sutures, so we had to add a second suture and tie  it at the heel and then run it over to and set down to parachute early. Then, we went distal and parachuted the toe with 6-0 Prolene, then  finished the two sides, again flushed with saline and then released  first distally and then proximally and got pulsatile flow in the graft. The vein was very large on the lateral aspect of this. The part that  goes across was not as big, probably 4 mm, and hopefully, this will  grow.   We did not reverse the argatroban, and we when

## 2021-08-12 NOTE — ANESTHESIA POSTPROCEDURE EVALUATION
Department of Anesthesiology  Postprocedure Note    Patient: Cherelle Sanchez  MRN: [id number]  YOB: 1975  Date of evaluation: 8/12/2021  Time:  2:21 PM     Procedure Summary     Date: 08/12/21 Room / Location: [address]    Anesthesia Start: 0730 Anesthesia Stop: 8304    Procedure: RIGHT ARM ACCESSORY CEPHALIC TO ANTECUBITAL ULNAR ARTERY FISTULA (Right ) Diagnosis:       End stage renal disease ([address]      (END STAGE RENAL DISEASE)    Surgeons: Felicity Bill MD Responsible Provider: Iwona Powers MD    Anesthesia Type: general ASA Status: 3          Anesthesia Type: general    Manas Phase I: Manas Score: 10    Manas Phase II: Manas Score: 10    Last vitals: Reviewed and per EMR flowsheets.        Anesthesia Post Evaluation    Patient location during evaluation: PACU  Patient participation: complete - patient participated  Level of consciousness: awake and alert  Pain score: 0  Airway patency: patent  Nausea & Vomiting: no nausea and no vomiting  Complications: no  Cardiovascular status: blood pressure returned to baseline  Respiratory status: acceptable  Hydration status: euvolemic

## 2021-08-12 NOTE — PROGRESS NOTES
No thrill detected. Call placed to [facility]. No answer. Over head paged in house instead.

## 2021-08-12 NOTE — PROGRESS NOTES
Vss. Surgical glue site is intact. No further drainage noted. Very faint bruit heard. Fingers are warm, move well, valentina well. No c/o. When asked she said her pain was improving. Very sleepy, but oriented when awakened.

## 2021-08-13 ENCOUNTER — TELEPHONE (OUTPATIENT)
Dept: FAMILY MEDICINE CLINIC | Age: 46
End: 2021-08-13

## 2021-08-13 NOTE — TELEPHONE ENCOUNTER
[facility] Transitions Initial Follow Up Call    Outreach made within 2 business days of discharge: Yes    Patient: Екатерина Dalton Patient : 1975   MRN: <[id number]>  Reason for Admission: There are no discharge diagnoses documented for the most recent discharge. Discharge Date: 21       Spoke with: Patient    Discharge department/facility: [facility]    TCM Interactive Patient Contact:  Was patient able to fill all prescriptions:   Was patient instructed to bring all medications to the follow-up visit:   Is patient taking all medications as directed in the discharge summary?    Does patient understand their discharge instructions:   Does patient have questions or concerns that need addressed prior to 7-14 day follow up office visit: no    Scheduled appointment with PCP within 7-14 days    Follow Up  Future Appointments   Date Time Provider Rhett Russo   2021 10:00 AM Manjinder Estevez MD [id number]       Melly Luna MA

## 2021-08-15 PROBLEM — Z00.00 ROUTINE ADULT HEALTH MAINTENANCE: Status: RESOLVED | Noted: 2020-09-06 | Resolved: 2021-08-15

## 2021-09-03 ENCOUNTER — OFFICE VISIT (OUTPATIENT)
Dept: SURGERY | Age: 46
End: 2021-09-03

## 2021-09-03 VITALS — BODY MASS INDEX: 32.06 KG/M2 | DIASTOLIC BLOOD PRESSURE: 70 MMHG | WEIGHT: 181 LBS | SYSTOLIC BLOOD PRESSURE: 118 MMHG

## 2021-09-03 DIAGNOSIS — Z99.2 HEMODIALYSIS PATIENT (HCC): ICD-10-CM

## 2021-09-03 DIAGNOSIS — I12.0 HYPERTENSIVE KIDNEY DISEASE WITH ESRD (END-STAGE RENAL DISEASE) (HCC): Primary | ICD-10-CM

## 2021-09-03 DIAGNOSIS — D75.829 HEPARIN-INDUCED THROMBOCYTOPENIA: ICD-10-CM

## 2021-09-03 DIAGNOSIS — T82.9XXA COMPLICATION OF VASCULAR ACCESS FOR DIALYSIS, INITIAL ENCOUNTER: ICD-10-CM

## 2021-09-03 DIAGNOSIS — N18.6 HYPERTENSIVE KIDNEY DISEASE WITH ESRD (END-STAGE RENAL DISEASE) (HCC): Primary | ICD-10-CM

## 2021-09-03 PROCEDURE — 99024 POSTOP FOLLOW-UP VISIT: CPT | Performed by: SURGERY

## 2021-09-03 ASSESSMENT — ENCOUNTER SYMPTOMS
EYES NEGATIVE: 1
RESPIRATORY NEGATIVE: 1
GASTROINTESTINAL NEGATIVE: 1
ALLERGIC/IMMUNOLOGIC NEGATIVE: 1

## 2021-09-03 NOTE — PROGRESS NOTES
Daily Progress Note   Sampson Kocher, MD      9/3/2021    Chief Complaint   Patient presents with    Post-Op Check     S/p, Right arm accessory cephalic to antecubital ulnar artery fistula, 8/12. Pt states the same bandage has been on since surgery and she is ready for it to come off, she does have some concerns, dialysis states its not doing anything they cant hear anything and may have to go back in to stretch veins. HISTORY OF PRESENT ILLNESS:                The patient is a 48 y.o. female who presents with a post op visit for right arm accessory cephalic to antecubital ulnar artery fistula done on 8/12/21. Ms. Tiesha Beckett says at first after surgery dialysis could hear her fistula, but very faint. Today dialysis couldn't hear her fistula at all. Dr. Cheryl Chin can't hear the fistula either. She has been taking a low dose of Eliquis daily since the surgery. She is allergic to heparin so it was not used during surgery.      Past Medical History:   Diagnosis Date    Central venous catheter in place     Right Jugular    Dialysis patient ([facility] 03/05/2021    M, W, F    ESRD (end stage renal disease) ([facility]     GERD (gastroesophageal reflux disease) 2/13/2020    Hypertension 2/13/2020    Kidney transplant status        Past Surgical History:   Procedure Laterality Date    DIALYSIS FISTULA CREATION Right 8/12/2021    RIGHT ARM ACCESSORY CEPHALIC TO ANTECUBITAL ULNAR ARTERY FISTULA performed by Shaye Brunson MD at [facility] Bilateral 2017    KIDNEY TRANSPLANT Right 11/2014       Social History     Socioeconomic History    Marital status: Single     Spouse name: Not on file    Number of children: Not on file    Years of education: Not on file    Highest education level: Not on file   Occupational History    Not on file   Tobacco Use    Smoking status: Never Smoker    Smokeless tobacco: Never Used   Vaping Use    Vaping Use: Never used   Substance and Sexual Activity    Alcohol use: No    Drug use: No    Sexual activity: Yes     Partners: Male   Other Topics Concern    Not on file   Social History Narrative    Not on file     Social Determinants of Health     Financial Resource Strain:     Difficulty of Paying Living Expenses:    Food Insecurity:     Worried About Running Out of Food in the Last Year:     [address] in the Last Year:    Transportation Needs:     Lack of Transportation (Medical):      Lack of Transportation (Non-Medical):    Physical Activity:     Days of Exercise per Week:     Minutes of Exercise per Session:    Stress:     Feeling of Stress :    Social Connections:     Frequency of Communication with Friends and Family:     Frequency of Social Gatherings with Friends and Family:     Attends Jew Services:     Active Member of Clubs or Organizations:     Attends Club or Organization Meetings:     Marital Status:    Intimate Partner Violence:     Fear of Current or Ex-Partner:     Emotionally Abused:     Physically Abused:     Sexually Abused:        Family History   Problem Relation Age of Onset    Heart Attack Father          Current Outpatient Medications:     acetaminophen (TYLENOL) 325 MG tablet, Take 650 mg by mouth every 6 hours as needed for Pain, Disp: , Rfl:     B Complex-C-Folic Acid (RENAL VITAMIN PO), Take 1 capsule by mouth three times a week With dialysis, Disp: , Rfl:     Apixaban (ELIQUIS PO), Take by mouth, Disp: , Rfl:     cloNIDine (CATAPRES) 0.1 MG tablet, Take 0.1 mg by mouth as needed , Disp: , Rfl:     predniSONE (DELTASONE) 5 MG tablet, Take 5 mg by mouth daily, Disp: , Rfl:     doxazosin (CARDURA) 1 MG tablet, Take 1 mg by mouth 2 times daily , Disp: , Rfl:     tacrolimus (PROGRAF) 1 MG capsule, Take 1 mg by mouth 2 times daily Indications: two in the AM and three at night, Disp: , Rfl:     Cyanocobalamin (VITAMIN B 12 PO), Take 1 tablet by mouth daily, Disp: , Rfl:     vitamin D (CHOLECALCIFEROL) reviewed and are negative. Physical Exam  Vitals and nursing note reviewed. Constitutional:       Appearance: Normal appearance. She is well-developed. HENT:      Mouth/Throat:      Pharynx: No oropharyngeal exudate. Eyes:      Conjunctiva/sclera: Conjunctivae normal.      Pupils: Pupils are equal, round, and reactive to light. Cardiovascular:      Rate and Rhythm: Normal rate and regular rhythm. Pulses:           Radial pulses are 2+ on the right side and 2+ on the left side. Femoral pulses are 2+ on the right side and 2+ on the left side. Dorsalis pedis pulses are 0 on the right side and 0 on the left side. Posterior tibial pulses are 2+ on the right side and 2+ on the left side. Heart sounds: Normal heart sounds. Comments:   7/16/21  Right ulnar: +2  Co-dominant Anthony test    Doppler 7/16/2021  Rt DP: none found  Rt PT: triphasic  Rt AT: triphasic    Lt DP: triphasic (lateral)  Lt PT: triphasic  Lt AT: not checked    Pulmonary:      Effort: Pulmonary effort is normal.      Breath sounds: Normal breath sounds. Abdominal:      General: Bowel sounds are normal.       Musculoskeletal:         General: Normal range of motion. Arms:       Cervical back: Normal range of motion. Neurological:      Mental Status: She is alert and oriented to person, place, and time. Deep Tendon Reflexes: Reflexes are normal and symmetric. Psychiatric:         Speech: Speech normal.         Behavior: Behavior normal.         Thought Content: Thought content normal.         Judgment: Judgment normal.           ASSESSMENT:    Problem List Items Addressed This Visit     Hypertensive kidney disease with ESRD (end-stage renal disease) ([address] - Primary    Heparin-induced thrombocytopenia ([address]    Hemodialysis patient ([address]    Complication of vascular access for dialysis      Unfortunately her right arm ulnar artery at the elbow to accessory cephalic has clotted.   She had to stop her Eliquis to have a large renal cyst drained  I suspect that is when we lost the bruit and thrill. I explained we cannot do an intake the clot out of the vein, rather we had to proceed with Dr. Maria Isabel Garcia  plan to do a [place] Manuel loop will start at the ulnar artery at the elbow and end in a brachial vein higher up, as always we will look with the ultrasound in the OR to pick this vein. We will have to use argatroban again    PLAN:    Call next week about your surgery so we can choose a day to schedule. Charlene Thomas MA am scribing for and in the presence of Malou Xiao MD on this date of 09/03/21    I Mikala Gillis MD personally performed the services described in this documentation as scribed by the Medical Assistant Benjamin Thomas in my presence and it is both accurate and complete.         Electronically signed by Malou Xiao MD on 9/3/2021 at 4:38 PM

## 2021-09-09 ENCOUNTER — TELEPHONE (OUTPATIENT)
Dept: SURGERY | Age: 46
End: 2021-09-09

## 2021-09-09 NOTE — TELEPHONE ENCOUNTER
Susy Alvarado called to say she wants to schedule surgery on her arm in October. She will call at the end of September to figure out an October date that works for her.

## 2022-04-05 ENCOUNTER — OFFICE VISIT (OUTPATIENT)
Dept: VASCULAR SURGERY | Age: 47
End: 2022-04-05
Payer: COMMERCIAL

## 2022-04-05 ENCOUNTER — HOSPITAL ENCOUNTER (OUTPATIENT)
Dept: GENERAL RADIOLOGY | Age: 47
Discharge: HOME | End: 2022-04-05
Payer: COMMERCIAL

## 2022-04-05 ENCOUNTER — HOSPITAL ENCOUNTER (OUTPATIENT)
Age: 47
Discharge: HOME | End: 2022-04-05
Payer: COMMERCIAL

## 2022-04-05 VITALS
WEIGHT: 158 LBS | BODY MASS INDEX: 28 KG/M2 | DIASTOLIC BLOOD PRESSURE: 82 MMHG | HEIGHT: 63 IN | SYSTOLIC BLOOD PRESSURE: 142 MMHG

## 2022-04-05 DIAGNOSIS — I12.0 HYPERTENSIVE KIDNEY DISEASE WITH ESRD (END-STAGE RENAL DISEASE) (HCC): Primary | ICD-10-CM

## 2022-04-05 DIAGNOSIS — N18.6 HYPERTENSIVE KIDNEY DISEASE WITH ESRD (END-STAGE RENAL DISEASE) (HCC): Primary | ICD-10-CM

## 2022-04-05 DIAGNOSIS — Q85.1 TUBEROUS SCLEROSIS SYNDROME (HCC): ICD-10-CM

## 2022-04-05 DIAGNOSIS — N18.6 END STAGE RENAL DISEASE (HCC): ICD-10-CM

## 2022-04-05 DIAGNOSIS — Z76.82 ORGAN TRANSPLANT CANDIDATE: ICD-10-CM

## 2022-04-05 DIAGNOSIS — D80.3 SELECTIVE DEFICIENCY OF IGG SUBCLASSES (HCC): ICD-10-CM

## 2022-04-05 DIAGNOSIS — D75.829 HEPARIN-INDUCED THROMBOCYTOPENIA: ICD-10-CM

## 2022-04-05 DIAGNOSIS — T82.510A: ICD-10-CM

## 2022-04-05 DIAGNOSIS — Z11.1 PPD SCREENING TEST: ICD-10-CM

## 2022-04-05 DIAGNOSIS — Z79.01 CURRENT USE OF LONG TERM ANTICOAGULATION: ICD-10-CM

## 2022-04-05 PROCEDURE — 1036F TOBACCO NON-USER: CPT | Performed by: SURGERY

## 2022-04-05 PROCEDURE — 71046 X-RAY EXAM CHEST 2 VIEWS: CPT

## 2022-04-05 PROCEDURE — G8427 DOCREV CUR MEDS BY ELIG CLIN: HCPCS | Performed by: SURGERY

## 2022-04-05 PROCEDURE — 99214 OFFICE O/P EST MOD 30 MIN: CPT | Performed by: SURGERY

## 2022-04-05 PROCEDURE — G8417 CALC BMI ABV UP PARAM F/U: HCPCS | Performed by: SURGERY

## 2022-04-05 ASSESSMENT — ENCOUNTER SYMPTOMS
GASTROINTESTINAL NEGATIVE: 1
EYES NEGATIVE: 1
RESPIRATORY NEGATIVE: 1
ALLERGIC/IMMUNOLOGIC NEGATIVE: 1

## 2022-04-05 NOTE — PROGRESS NOTES
Daily Progress Note   Sam Martínez MD      4/5/2022    Chief Complaint   Patient presents with    Follow-up     discuss fistula; hard lump on right lower arm; Diallo Woody M,W,F         HISTORY OF PRESENT ILLNESS:                The patient is a 50 y.o. female who presents with an office visit to discuss her right arm fistula. Ms. Xander Gómez says she has a lump on her right forearm that is sometimes painful, but mainly has grown and become harder. She says Dr. Lucian Hillman is aware of the lump. She has been getting dialysis through a tunnel catheter on the left. She has had the catheter for more than a year.        Past Medical History:   Diagnosis Date    Central venous catheter in place     Right Jugular    Dialysis patient ([facility] 03/05/2021    M, W, F    ESRD (end stage renal disease) ([facility]     GERD (gastroesophageal reflux disease) 2/13/2020    Hypertension 2/13/2020    Kidney transplant status        Past Surgical History:   Procedure Laterality Date    DIALYSIS FISTULA CREATION Right 8/12/2021    RIGHT ARM ACCESSORY CEPHALIC TO ANTECUBITAL ULNAR ARTERY FISTULA performed by Keesha Westfall MD at [address] [place] [address] Bilateral 2017    KIDNEY TRANSPLANT Right 11/2014       Social History     Socioeconomic History    Marital status: Single     Spouse name: Not on file    Number of children: Not on file    Years of education: Not on file    Highest education level: Not on file   Occupational History    Not on file   Tobacco Use    Smoking status: Never Smoker    Smokeless tobacco: Never Used   Vaping Use    Vaping Use: Never used   Substance and Sexual Activity    Alcohol use: No    Drug use: No    Sexual activity: Yes     Partners: Male   Other Topics Concern    Not on file   Social History Narrative    Not on file     Social Determinants of Health     Financial Resource Strain:     Difficulty of Paying Living Expenses: Not on file   Food Insecurity:     Worried About Running Out of Food in the Last Year: Not on file    Ran Out of Food in the Last Year: Not on file   Transportation Needs:     Lack of Transportation (Medical): Not on file    Lack of Transportation (Non-Medical):  Not on file   Physical Activity:     Days of Exercise per Week: Not on file    Minutes of Exercise per Session: Not on file   Stress:     Feeling of Stress : Not on file   Social Connections:     Frequency of Communication with Friends and Family: Not on file    Frequency of Social Gatherings with Friends and Family: Not on file    Attends Buddhism Services: Not on file    Active Member of Clubs or Organizations: Not on file    Attends Club or Organization Meetings: Not on file    Marital Status: Not on file   Intimate Partner Violence:     Fear of Current or Ex-Partner: Not on file    Emotionally Abused: Not on file    Physically Abused: Not on file    Sexually Abused: Not on file   Housing Stability:     Unable to Pay for Housing in the Last Year: Not on file    Number of [place] in the Last Year: Not on file    Unstable Housing in the Last Year: Not on file       Family History   Problem Relation Age of Onset    Heart Attack Father          Current Outpatient Medications:     B Complex-C-Folic Acid (RENAL VITAMIN PO), Take 1 capsule by mouth three times a week With dialysis, Disp: , Rfl:     Apixaban (ELIQUIS PO), Take by mouth, Disp: , Rfl:     cloNIDine (CATAPRES) 0.1 MG tablet, Take 0.1 mg by mouth as needed , Disp: , Rfl:     predniSONE (DELTASONE) 5 MG tablet, Take 5 mg by mouth daily, Disp: , Rfl:     doxazosin (CARDURA) 1 MG tablet, Take 1 mg by mouth 2 times daily , Disp: , Rfl:     tacrolimus (PROGRAF) 1 MG capsule, Take 1 mg by mouth 2 times daily Indications: two in the AM and three at night, Disp: , Rfl:     vitamin D (CHOLECALCIFEROL) 1000 UNIT TABS tablet, Take 1,000 Units by mouth once a week Indications: on thursday, Disp: , Rfl:     METOPROLOL SUCCINATE ER PO, Negative. Allergic/Immunologic: Negative. Neurological: Negative. Hematological: Negative. Psychiatric/Behavioral: Negative. All other systems reviewed and are negative. Physical Exam  Vitals and nursing note reviewed. Constitutional:       Appearance: Normal appearance. She is well-developed. HENT:      Mouth/Throat:      Pharynx: No oropharyngeal exudate. Eyes:      Conjunctiva/sclera: Conjunctivae normal.      Pupils: Pupils are equal, round, and reactive to light. Cardiovascular:      Rate and Rhythm: Normal rate and regular rhythm. Pulses:           Carotid pulses are 2+ on the right side and 2+ on the left side. Radial pulses are 2+ on the right side and 2+ on the left side. Femoral pulses are 2+ on the right side and 2+ on the left side. Dorsalis pedis pulses are 0 on the right side and 2+ on the left side. Posterior tibial pulses are 2+ on the right side and 2+ on the left side. Heart sounds: Normal heart sounds. Comments:   Doppler 4/5/22:  Rt DP:   Rt PT: biphasic  Rt AT: biphasic    Lt DP: biphasic (lateral)  Lt PT: monophasic  Lt AT:      7/16/21  Right ulnar: +2  Co-dominant Anthony test    Doppler 7/16/2021  Rt DP: none found  Rt PT: triphasic  Rt AT: triphasic    Lt DP: triphasic (lateral)  Lt PT: triphasic  Lt AT: not checked    Pulmonary:      Effort: Pulmonary effort is normal.      Breath sounds: Normal breath sounds. Chest:       Abdominal:      General: Bowel sounds are normal.       Musculoskeletal:         General: Normal range of motion. Arms:       Cervical back: Normal range of motion. Comments:   4/5/22  Part of original fistula, no bruit, no thrill. 3.0 cm wide x 2.0 cm long x 0.6 cm high   Neurological:      Mental Status: She is alert and oriented to person, place, and time. Deep Tendon Reflexes: Reflexes are normal and symmetric.    Psychiatric:         Speech: Speech normal.         Behavior: clotting since that seems to be what happened to her last brachiocephalic fistula it clotted while they held the blood thinner to do a kidney biopsy  PLAN:    Call [phone] to schedule a right arm vein mapping. Colby Ramirez MA am scribing for and in the presence of Jerrod Scott MD on this date of 04/05/22    I Negin Guardado MD personally performed the services described in this documentation as scribed by the Medical Assistant Ino Thomas in my presence and it is both accurate and complete.         Electronically signed by Jerrod Scott MD on 4/5/2022 at 3:26 PM

## 2022-05-19 ENCOUNTER — HOSPITAL ENCOUNTER (OUTPATIENT)
Dept: VASCULAR LAB | Age: 47
Discharge: HOME | End: 2022-05-19
Payer: COMMERCIAL

## 2022-05-19 DIAGNOSIS — N18.6 HYPERTENSIVE KIDNEY DISEASE WITH ESRD (END-STAGE RENAL DISEASE) (HCC): ICD-10-CM

## 2022-05-19 DIAGNOSIS — I12.0 HYPERTENSIVE KIDNEY DISEASE WITH ESRD (END-STAGE RENAL DISEASE) (HCC): ICD-10-CM

## 2022-05-19 DIAGNOSIS — T82.510A: ICD-10-CM

## 2022-05-19 PROCEDURE — 93971 EXTREMITY STUDY: CPT

## 2022-06-08 ENCOUNTER — TELEPHONE (OUTPATIENT)
Dept: VASCULAR SURGERY | Age: 47
End: 2022-06-08

## 2022-06-15 ENCOUNTER — TELEPHONE (OUTPATIENT)
Dept: VASCULAR SURGERY | Age: 47
End: 2022-06-15

## 2022-06-15 NOTE — TELEPHONE ENCOUNTER
Pt is scheduled for surgery on 6-22-22 with Dr Andrea Pemberton. Due to transportation issues, she would like to see if there is a later time available on the 29th. Please call.

## 2022-06-16 NOTE — TELEPHONE ENCOUNTER
Spoke with patient regarding scheduled surgery on 06- with Dr. Princess Tang. Patient states she spoke with her insurance company and they will arrange transportation for her on the 6/22/2022 date. No need to move to the 29th. Patient is asked to arrive by 6:00 AM @ [address] after midnight. May take Blood Pressure medication with a small sip of water to wash them down. Please hold your Eliquis (2) two full days prior to surgery date. You will need someone to drive you home following this procedure. Please bring a Photo ID & Insurance card with you, and check-in at the Surgery Desk down the right-hand hallway on the first floor. Surgery is scheduled to start at approx. 7:30 AM and should take approx. 120 minutes. If the hospital needs any further information, someone will give you a call. Patient expressed a verbal understanding of these instructions and had no further questions at this time. Mailed instruction letter. Call ended.

## 2022-06-20 RX ORDER — SEVELAMER HYDROCHLORIDE 800 MG/1
2 TABLET, FILM COATED ORAL
COMMUNITY

## 2022-06-20 RX ORDER — FOLIC ACID 0.8 MG
1 TABLET ORAL DAILY
COMMUNITY

## 2022-06-20 RX ORDER — SPIRONOLACTONE 25 MG/1
25 TABLET ORAL DAILY
COMMUNITY
Start: 2021-10-25

## 2022-06-20 NOTE — PROGRESS NOTES
toes      For your safety, please do not wear any jewelry or body piercing's on the day of surgery. All jewelry must be removed. If you have dentures, they will be removed before going to operating room. For your convenience, we will provide you with a container. If you wear contact lenses or glasses, they will be removed, please bring a case for them. If you have a living will and a durable power of  for healthcare, please bring in a copy. As part of our patient safety program to minimize surgical site infections, we ask you to do the following:    · Please notify your surgeon if you develop any illness between         now and the  day of your surgery. · This includes a cough, cold, fever, sore throat, nausea,         or vomiting, and diarrhea, etc.  ·  Please notify your surgeon if you experience dizziness, shortness         of breath or blurred vision between now and the time of your surgery. Do not shave your operative site 96 hours prior to surgery. For face and neck surgery, men may use an electric razor 48 hours   prior to surgery. You may shower the night before surgery or the morning of   your surgery with an antibacterial soap. You will need to bring a photo ID and insurance card    [facility] has an onsite pharmacy, would you like to utilize our pharmacy     If you will be staying overnight and use a C-pap machine, please bring   your C-pap to hospital     Our goal is to provide you with excellent care, therefore, visitors will be limited to two(2) in the room at a time so that we may focus on providing this care for you. Please contact pre-admission testing if you have any further questions. [facility] phone number:  [phone] PAT fax number:  [phone]  Please note these are generalized instructions for all surgical cases, you may be provided with more specific instructions according to your surgery.     C-Difficile admission screening and protocol:       * Admitted with diarrhea? [] YES    [x]  NO     *Prior history of C-Diff. In last 3 months? [] YES    [x]  NO     *Antibiotic use in the past 6-8 weeks? [x]  NO    []  YES                 If yes, which ANTIBIOTIC AND REASON______     *Prior hospitalization or nursing home in the last month? []  YES    [x]  NO        SAFETY FIRST. .call before you fall

## 2022-06-20 NOTE — PROGRESS NOTES
H&P for 6/22/22 procedure:    Patient stated Dr Ryan garcía told her they will reach out to [facility] for pre-operative H&P. Call out to Dr Ryan Harvey office Carmen Sher to confirm. Call back from Dr Bekah Lee office [facility]).  She stated Dr Iván Bhakta will do H&P

## 2022-06-21 ENCOUNTER — ANESTHESIA EVENT (OUTPATIENT)
Dept: OPERATING ROOM | Age: 47
End: 2022-06-21
Payer: COMMERCIAL

## 2022-06-21 ENCOUNTER — TELEPHONE (OUTPATIENT)
Dept: SURGERY | Age: 47
End: 2022-06-21

## 2022-06-21 NOTE — TELEPHONE ENCOUNTER
Spoke with patient regarding her surgery scheduled for 6/22/2022 with Dr. Yanely Woodard as well as her transportation. She states Heidi Villaseñor has a new transportation [address] [place] [address]. She states she did receive a text message confirmation from them regarding them picking her up at 5:25 tomorrow morning. She just wanted to make sure IF they don't pickup her up, that she could be rescheduled on 6/29. I let her know that we should be able to accommodate that request.  Asked that if they don't pick her up, that she call our office and leave a message with the answering service. She agreed to this. Call ended.

## 2022-06-21 NOTE — TELEPHONE ENCOUNTER
PT would like to know if she is unable to make it to her surgery for tomorrow if she can get scheduled on 6/29. She does not want to cancel her surgery that is already scheduled for 6/22 but she is not sure if her transportation will be picking her up so she would also like to have the 6/29 surgery date on stand by. Call and advise.

## 2022-06-22 ENCOUNTER — ANESTHESIA (OUTPATIENT)
Dept: OPERATING ROOM | Age: 47
End: 2022-06-22
Payer: COMMERCIAL

## 2022-06-22 ENCOUNTER — HOSPITAL ENCOUNTER (OUTPATIENT)
Age: 47
Setting detail: OUTPATIENT SURGERY
Discharge: HOME | End: 2022-06-22
Attending: SURGERY | Admitting: SURGERY
Payer: COMMERCIAL

## 2022-06-22 VITALS
HEIGHT: 62 IN | SYSTOLIC BLOOD PRESSURE: 144 MMHG | TEMPERATURE: 97 F | RESPIRATION RATE: 18 BRPM | OXYGEN SATURATION: 98 % | HEART RATE: 73 BPM | WEIGHT: 160 LBS | DIASTOLIC BLOOD PRESSURE: 96 MMHG | BODY MASS INDEX: 29.44 KG/M2

## 2022-06-22 DIAGNOSIS — N18.5 CKD (CHRONIC KIDNEY DISEASE), STAGE V (HCC): ICD-10-CM

## 2022-06-22 DIAGNOSIS — N18.6 END STAGE RENAL FAILURE ON DIALYSIS (HCC): ICD-10-CM

## 2022-06-22 DIAGNOSIS — Z79.01 CURRENT USE OF LONG TERM ANTICOAGULATION: Primary | ICD-10-CM

## 2022-06-22 DIAGNOSIS — Z99.2 END STAGE RENAL FAILURE ON DIALYSIS (HCC): ICD-10-CM

## 2022-06-22 DIAGNOSIS — G89.18 ACUTE POST-OPERATIVE PAIN: ICD-10-CM

## 2022-06-22 LAB — POTASSIUM, WHOLE BLOOD: 4.8 MMOL/L (ref 3.5–5.1)

## 2022-06-22 PROCEDURE — 3600000015 HC SURGERY LEVEL 5 ADDTL 15MIN: Performed by: SURGERY

## 2022-06-22 PROCEDURE — 6360000002 HC RX W HCPCS: Performed by: ANESTHESIOLOGY

## 2022-06-22 PROCEDURE — 2720000010 HC SURG SUPPLY STERILE: Performed by: SURGERY

## 2022-06-22 PROCEDURE — 6360000002 HC RX W HCPCS

## 2022-06-22 PROCEDURE — 2580000003 HC RX 258: Performed by: SURGERY

## 2022-06-22 PROCEDURE — 7100000001 HC PACU RECOVERY - ADDTL 15 MIN: Performed by: SURGERY

## 2022-06-22 PROCEDURE — 6360000002 HC RX W HCPCS: Performed by: SURGERY

## 2022-06-22 PROCEDURE — 3700000000 HC ANESTHESIA ATTENDED CARE: Performed by: SURGERY

## 2022-06-22 PROCEDURE — 2500000003 HC RX 250 WO HCPCS

## 2022-06-22 PROCEDURE — 2580000003 HC RX 258: Performed by: ANESTHESIOLOGY

## 2022-06-22 PROCEDURE — C1768 GRAFT, VASCULAR: HCPCS | Performed by: SURGERY

## 2022-06-22 PROCEDURE — 2709999900 HC NON-CHARGEABLE SUPPLY: Performed by: SURGERY

## 2022-06-22 PROCEDURE — 6370000000 HC RX 637 (ALT 250 FOR IP): Performed by: ANESTHESIOLOGY

## 2022-06-22 PROCEDURE — 2500000003 HC RX 250 WO HCPCS: Performed by: SURGERY

## 2022-06-22 PROCEDURE — 7100000000 HC PACU RECOVERY - FIRST 15 MIN: Performed by: SURGERY

## 2022-06-22 PROCEDURE — 3600000005 HC SURGERY LEVEL 5 BASE: Performed by: SURGERY

## 2022-06-22 PROCEDURE — 7100000011 HC PHASE II RECOVERY - ADDTL 15 MIN: Performed by: SURGERY

## 2022-06-22 PROCEDURE — 84132 ASSAY OF SERUM POTASSIUM: CPT

## 2022-06-22 PROCEDURE — 35045 REPAIR DEFECT OF ARM ARTERY: CPT | Performed by: SURGERY

## 2022-06-22 PROCEDURE — 7100000010 HC PHASE II RECOVERY - FIRST 15 MIN: Performed by: SURGERY

## 2022-06-22 PROCEDURE — A4217 STERILE WATER/SALINE, 500 ML: HCPCS | Performed by: SURGERY

## 2022-06-22 PROCEDURE — 3700000001 HC ADD 15 MINUTES (ANESTHESIA): Performed by: SURGERY

## 2022-06-22 PROCEDURE — 36830 ARTERY-VEIN NONAUTOGRAFT: CPT | Performed by: SURGERY

## 2022-06-22 PROCEDURE — 88304 TISSUE EXAM BY PATHOLOGIST: CPT

## 2022-06-22 DEVICE — IMPLANTABLE DEVICE: Type: IMPLANTABLE DEVICE | Site: ARM | Status: FUNCTIONAL

## 2022-06-22 RX ORDER — MAGNESIUM HYDROXIDE 1200 MG/15ML
LIQUID ORAL CONTINUOUS PRN
Status: COMPLETED | OUTPATIENT
Start: 2022-06-22 | End: 2022-06-22

## 2022-06-22 RX ORDER — PROPOFOL 10 MG/ML
INJECTION, EMULSION INTRAVENOUS PRN
Status: DISCONTINUED | OUTPATIENT
Start: 2022-06-22 | End: 2022-06-22 | Stop reason: SDUPTHER

## 2022-06-22 RX ORDER — SODIUM CHLORIDE 0.9 % (FLUSH) 0.9 %
5-40 SYRINGE (ML) INJECTION PRN
Status: DISCONTINUED | OUTPATIENT
Start: 2022-06-22 | End: 2022-06-22 | Stop reason: HOSPADM

## 2022-06-22 RX ORDER — SODIUM CHLORIDE 9 MG/ML
INJECTION, SOLUTION INTRAVENOUS PRN
Status: DISCONTINUED | OUTPATIENT
Start: 2022-06-22 | End: 2022-06-22 | Stop reason: HOSPADM

## 2022-06-22 RX ORDER — LIDOCAINE HYDROCHLORIDE 20 MG/ML
INJECTION, SOLUTION EPIDURAL; INFILTRATION; INTRACAUDAL; PERINEURAL PRN
Status: DISCONTINUED | OUTPATIENT
Start: 2022-06-22 | End: 2022-06-22 | Stop reason: SDUPTHER

## 2022-06-22 RX ORDER — HYDRALAZINE HYDROCHLORIDE 20 MG/ML
INJECTION INTRAMUSCULAR; INTRAVENOUS PRN
Status: DISCONTINUED | OUTPATIENT
Start: 2022-06-22 | End: 2022-06-22 | Stop reason: SDUPTHER

## 2022-06-22 RX ORDER — SODIUM CHLORIDE 0.9 % (FLUSH) 0.9 %
5-40 SYRINGE (ML) INJECTION EVERY 12 HOURS SCHEDULED
Status: DISCONTINUED | OUTPATIENT
Start: 2022-06-22 | End: 2022-06-22 | Stop reason: HOSPADM

## 2022-06-22 RX ORDER — OXYCODONE HYDROCHLORIDE 5 MG/1
5 TABLET ORAL
Status: COMPLETED | OUTPATIENT
Start: 2022-06-22 | End: 2022-06-22

## 2022-06-22 RX ORDER — FENTANYL CITRATE 50 UG/ML
INJECTION, SOLUTION INTRAMUSCULAR; INTRAVENOUS PRN
Status: DISCONTINUED | OUTPATIENT
Start: 2022-06-22 | End: 2022-06-22 | Stop reason: SDUPTHER

## 2022-06-22 RX ORDER — BUPIVACAINE HYDROCHLORIDE 5 MG/ML
INJECTION, SOLUTION EPIDURAL; INTRACAUDAL
Status: COMPLETED | OUTPATIENT
Start: 2022-06-22 | End: 2022-06-22

## 2022-06-22 RX ORDER — ARGATROBAN 250 MG/2.5ML
5 INJECTION INTRAVENOUS ONCE
Status: COMPLETED | OUTPATIENT
Start: 2022-06-22 | End: 2022-06-22

## 2022-06-22 RX ORDER — DROPERIDOL 2.5 MG/ML
0.62 INJECTION, SOLUTION INTRAMUSCULAR; INTRAVENOUS
Status: DISCONTINUED | OUTPATIENT
Start: 2022-06-22 | End: 2022-06-22 | Stop reason: HOSPADM

## 2022-06-22 RX ORDER — CLINDAMYCIN PHOSPHATE 900 MG/50ML
900 INJECTION INTRAVENOUS ONCE
Status: COMPLETED | OUTPATIENT
Start: 2022-06-22 | End: 2022-06-22

## 2022-06-22 RX ORDER — ONDANSETRON 2 MG/ML
INJECTION INTRAMUSCULAR; INTRAVENOUS PRN
Status: DISCONTINUED | OUTPATIENT
Start: 2022-06-22 | End: 2022-06-22 | Stop reason: SDUPTHER

## 2022-06-22 RX ORDER — HYDRALAZINE HYDROCHLORIDE 20 MG/ML
10 INJECTION INTRAMUSCULAR; INTRAVENOUS
Status: DISCONTINUED | OUTPATIENT
Start: 2022-06-22 | End: 2022-06-22 | Stop reason: HOSPADM

## 2022-06-22 RX ORDER — FENTANYL CITRATE 50 UG/ML
25 INJECTION, SOLUTION INTRAMUSCULAR; INTRAVENOUS EVERY 5 MIN PRN
Status: DISCONTINUED | OUTPATIENT
Start: 2022-06-22 | End: 2022-06-22 | Stop reason: HOSPADM

## 2022-06-22 RX ORDER — ROCURONIUM BROMIDE 10 MG/ML
INJECTION, SOLUTION INTRAVENOUS PRN
Status: DISCONTINUED | OUTPATIENT
Start: 2022-06-22 | End: 2022-06-22 | Stop reason: SDUPTHER

## 2022-06-22 RX ORDER — DEXAMETHASONE SODIUM PHOSPHATE 4 MG/ML
INJECTION, SOLUTION INTRA-ARTICULAR; INTRALESIONAL; INTRAMUSCULAR; INTRAVENOUS; SOFT TISSUE PRN
Status: DISCONTINUED | OUTPATIENT
Start: 2022-06-22 | End: 2022-06-22 | Stop reason: SDUPTHER

## 2022-06-22 RX ORDER — SODIUM CHLORIDE 9 MG/ML
INJECTION, SOLUTION INTRAVENOUS CONTINUOUS
Status: DISCONTINUED | OUTPATIENT
Start: 2022-06-22 | End: 2022-06-22 | Stop reason: HOSPADM

## 2022-06-22 RX ORDER — ONDANSETRON 2 MG/ML
4 INJECTION INTRAMUSCULAR; INTRAVENOUS
Status: DISCONTINUED | OUTPATIENT
Start: 2022-06-22 | End: 2022-06-22 | Stop reason: HOSPADM

## 2022-06-22 RX ORDER — OXYCODONE HYDROCHLORIDE AND ACETAMINOPHEN 5; 325 MG/1; MG/1
1 TABLET ORAL EVERY 6 HOURS PRN
Qty: 28 TABLET | Refills: 0 | Status: SHIPPED | OUTPATIENT
Start: 2022-06-22 | End: 2022-06-29

## 2022-06-22 RX ORDER — MIDAZOLAM HYDROCHLORIDE 1 MG/ML
INJECTION INTRAMUSCULAR; INTRAVENOUS PRN
Status: DISCONTINUED | OUTPATIENT
Start: 2022-06-22 | End: 2022-06-22 | Stop reason: SDUPTHER

## 2022-06-22 RX ADMIN — ROCURONIUM BROMIDE 10 MG: 10 SOLUTION INTRAVENOUS at 09:30

## 2022-06-22 RX ADMIN — FENTANYL CITRATE 50 MCG: 50 INJECTION INTRAMUSCULAR; INTRAVENOUS at 08:21

## 2022-06-22 RX ADMIN — ROCURONIUM BROMIDE 10 MG: 10 SOLUTION INTRAVENOUS at 08:33

## 2022-06-22 RX ADMIN — HYDRALAZINE HYDROCHLORIDE 5 MG: 20 INJECTION INTRAMUSCULAR; INTRAVENOUS at 08:27

## 2022-06-22 RX ADMIN — HYDRALAZINE HYDROCHLORIDE 5 MG: 20 INJECTION INTRAMUSCULAR; INTRAVENOUS at 09:52

## 2022-06-22 RX ADMIN — ROCURONIUM BROMIDE 20 MG: 10 SOLUTION INTRAVENOUS at 10:51

## 2022-06-22 RX ADMIN — SODIUM CHLORIDE: 9 INJECTION, SOLUTION INTRAVENOUS at 06:33

## 2022-06-22 RX ADMIN — OXYCODONE 5 MG: 5 TABLET ORAL at 15:08

## 2022-06-22 RX ADMIN — ARGATROBAN 3 MG: 100 INJECTION, SOLUTION INTRAVENOUS at 10:51

## 2022-06-22 RX ADMIN — ROCURONIUM BROMIDE 20 MG: 10 SOLUTION INTRAVENOUS at 10:02

## 2022-06-22 RX ADMIN — ARGATROBAN 5 MG: 100 INJECTION, SOLUTION INTRAVENOUS at 09:40

## 2022-06-22 RX ADMIN — CLINDAMYCIN PHOSPHATE 900 MG: 18 INJECTION, SOLUTION INTRAMUSCULAR; INTRAVENOUS at 07:40

## 2022-06-22 RX ADMIN — ROCURONIUM BROMIDE 30 MG: 10 SOLUTION INTRAVENOUS at 11:27

## 2022-06-22 RX ADMIN — MIDAZOLAM 2 MG: 1 INJECTION INTRAMUSCULAR; INTRAVENOUS at 07:27

## 2022-06-22 RX ADMIN — FENTANYL CITRATE 50 MCG: 50 INJECTION INTRAMUSCULAR; INTRAVENOUS at 07:30

## 2022-06-22 RX ADMIN — ROCURONIUM BROMIDE 10 MG: 10 SOLUTION INTRAVENOUS at 09:01

## 2022-06-22 RX ADMIN — HYDROMORPHONE HYDROCHLORIDE 0.5 MG: 1 INJECTION, SOLUTION INTRAMUSCULAR; INTRAVENOUS; SUBCUTANEOUS at 13:37

## 2022-06-22 RX ADMIN — ROCURONIUM BROMIDE 50 MG: 10 SOLUTION INTRAVENOUS at 07:31

## 2022-06-22 RX ADMIN — ONDANSETRON 4 MG: 2 INJECTION INTRAMUSCULAR; INTRAVENOUS at 11:52

## 2022-06-22 RX ADMIN — DEXAMETHASONE SODIUM PHOSPHATE 4 MG: 4 INJECTION, SOLUTION INTRAMUSCULAR; INTRAVENOUS at 07:41

## 2022-06-22 RX ADMIN — HYDROMORPHONE HYDROCHLORIDE 0.5 MG: 1 INJECTION, SOLUTION INTRAMUSCULAR; INTRAVENOUS; SUBCUTANEOUS at 13:32

## 2022-06-22 RX ADMIN — SUGAMMADEX 200 MG: 100 INJECTION, SOLUTION INTRAVENOUS at 12:11

## 2022-06-22 RX ADMIN — LIDOCAINE HYDROCHLORIDE 100 MG: 20 INJECTION, SOLUTION EPIDURAL; INFILTRATION; INTRACAUDAL; PERINEURAL at 07:31

## 2022-06-22 RX ADMIN — PROPOFOL 150 MG: 10 INJECTION, EMULSION INTRAVENOUS at 07:31

## 2022-06-22 ASSESSMENT — PAIN DESCRIPTION - ONSET
ONSET: ON-GOING
ONSET: AWAKENED FROM SLEEP

## 2022-06-22 ASSESSMENT — PAIN DESCRIPTION - LOCATION
LOCATION: ARM

## 2022-06-22 ASSESSMENT — PAIN SCALES - GENERAL
PAINLEVEL_OUTOF10: 10
PAINLEVEL_OUTOF10: 10
PAINLEVEL_OUTOF10: 9
PAINLEVEL_OUTOF10: 9
PAINLEVEL_OUTOF10: 6
PAINLEVEL_OUTOF10: 0
PAINLEVEL_OUTOF10: 7
PAINLEVEL_OUTOF10: 0

## 2022-06-22 ASSESSMENT — PAIN DESCRIPTION - DESCRIPTORS
DESCRIPTORS: BURNING;DISCOMFORT
DESCRIPTORS: DISCOMFORT;BURNING

## 2022-06-22 ASSESSMENT — PAIN DESCRIPTION - PAIN TYPE
TYPE: SURGICAL PAIN

## 2022-06-22 ASSESSMENT — PAIN - FUNCTIONAL ASSESSMENT
PAIN_FUNCTIONAL_ASSESSMENT: PREVENTS OR INTERFERES SOME ACTIVE ACTIVITIES AND ADLS
PAIN_FUNCTIONAL_ASSESSMENT: ACTIVITIES ARE NOT PREVENTED

## 2022-06-22 ASSESSMENT — PAIN DESCRIPTION - ORIENTATION
ORIENTATION: RIGHT

## 2022-06-22 ASSESSMENT — PAIN DESCRIPTION - FREQUENCY
FREQUENCY: CONTINUOUS

## 2022-06-22 NOTE — H&P
Agree with H&P from Outpt notes, no changes noted . I have presented reasonable alternatives to the patient's proposed care, treatment, and services. The discussion I have done encompassed risks, benefits, and side effects related to the alternatives and the risks related to not receiving the proposed care, treatment, and services. All questions answered. Patient wishes to proceed.  RT arm marked for rad aneurysm repair and brachial to axillary graft

## 2022-06-22 NOTE — PROGRESS NOTES
Pt in PACU post RIGHT BRACHIAL ARTERY TO AXILLARY VEIN GRAFT AND REPAIR OF RADIAL ARTERY ANEURYSM per Kelvin Cea. Pt sleepy from anesthesia.  VSS

## 2022-06-22 NOTE — PROGRESS NOTES
Discharge instructions, verbal and written, given to patient. Verbalize understanding. Did not want RN to share instructions with friend, who is patient's ride.

## 2022-06-22 NOTE — ANESTHESIA POSTPROCEDURE EVALUATION
Department of Anesthesiology  Postprocedure Note    Patient: Oksana Gallo  MRN: [id number]  YOB: 1975  Date of evaluation: 6/22/2022      Procedure Summary     Date: 06/22/22 Room / Location: [facility] OR [address] [place] [address]    Anesthesia Start: 4119 Anesthesia Stop: 6201    Procedure: RIGHT BRACHIAL ARTERY TO AXILLARY VEIN GRAFT AND REPAIR OF RADIAL ARTERY ANEURYSM (Right Arm Lower) Diagnosis:       End stage renal failure on dialysis ([address]      (End stage renal DISEASE on dialysis)    Surgeons: Ann Bernstein MD Responsible Provider: Rosette Arredondo MD    Anesthesia Type: general ASA Status: 3          Anesthesia Type: No value filed. Manas Phase I: Manas Score: 9    Manas Phase II: Manas Score: 9    Last vitals:   Vitals Value Taken Time   /81 06/22/22 1430   Temp 97 °F (36.1 °C) 06/22/22 1228   Pulse 74 06/22/22 1435   Resp 17 06/22/22 1435   SpO2 98 % 06/22/22 1435   Vitals shown include unvalidated device data.       Anesthesia Post Evaluation    Patient location during evaluation: PACU  Level of consciousness: awake and alert  Airway patency: patent  Nausea & Vomiting: no nausea and no vomiting  Complications: no  Cardiovascular status: blood pressure returned to baseline  Respiratory status: acceptable  Hydration status: euvolemic  Comments: Postoperative Anesthesia Note    Name:    Oksana Gallo  MRN:      [id number]    Patient Vitals in the past 12 hrs:  06/22/22 1442, BP:(!) 143/96, Temp:97 °F (36.1 °C), Temp src:Temporal, Pulse:73, Resp:18, SpO2:98 %  06/22/22 1431, Pulse:72  06/22/22 1415, BP:130/84, Pulse:72, Resp:19, SpO2:97 %  06/22/22 1400, BP:122/83, Pulse:69, Resp:26, SpO2:96 %  06/22/22 1345, BP:122/84, Pulse:71, Resp:17, SpO2:94 %  06/22/22 1330, BP:(!) 146/100, Pulse:74, Resp:16, SpO2:100 %  06/22/22 1315, BP:(!) 146/92, Pulse:77, Resp:17, SpO2:100 %  06/22/22 1301, BP:(!) 150/91  06/22/22 1245, BP:(!) 147/91, Pulse:74, Resp:18, SpO2:100 %  06/22/22 1241, BP:(!) 147/96, Pulse:74, Resp:18, SpO2:100 %  06/22/22 1230, BP:(!) 146/92, Pulse:75, Resp:19, SpO2:99 %  06/22/22 1228, BP:(!) 146/92, Temp:97 °F (36.1 °C), Temp src:Temporal, Pulse:75, Resp:16, SpO2:99 %  06/22/22 0630, BP:(!) 169/105  06/22/22 0614, BP:(!) 151/109, Temp:97.1 °F (36.2 °C), Temp src:Temporal, Pulse:82, Resp:18, SpO2:97 %  06/22/22 0607, Height:5' 2\" (1.575 m), Weight:160 lb (72.6 kg)     LABS:    CBC  Lab Results       Component                Value               Date/Time                  WBC                      5.5                 08/12/2021 07:27 AM        HGB                      11.8 (L)            08/12/2021 07:27 AM        HCT                      36.7                08/12/2021 07:27 AM        PLT                      122 (L)             08/12/2021 07:27 AM   RENAL  Lab Results       Component                Value               Date/Time                  K                        4.8                 06/22/2022 06:27 AM   COAGS  No results found for: PROTIME, INR, APTT    Intake & Output:  @13IZNA@    Nausea & Vomiting:  No    Level of Consciousness:  Awake    Pain Assessment:  Adequate analgesia    Anesthesia Complications:  No apparent anesthetic complications    SUMMARY      Vital signs stable  OK to discharge from Stage I post anesthesia care.   Care transferred from Anesthesiology department on discharge from perioperative area

## 2022-06-22 NOTE — ANESTHESIA PRE PROCEDURE
Department of Anesthesiology  Preprocedure Note       Name:  Faith Guthrie   Age:  50 y.o.  :  1975                                          MRN:  [id number]         Date:  2022      Surgeon: Keisha Navarro):  Sis Caba MD    Procedure: Procedure(s):  RIGHT BRACHIAL ARTERY TO AXILLARY VEIN GRAFT AND POSSIBLE REPAIR OF RADIAL ARTERY ANEURYSM    Medications prior to admission:   Prior to Admission medications    Medication Sig Start Date End Date Taking?  Authorizing Provider   NIFEdipine (PROCARDIA PO) Take by mouth   Yes Historical Provider, MD   Magnesium 500 MG CAPS Take 1 tablet by mouth daily   Yes Historical Provider, MD   spironolactone (ALDACTONE) 25 MG tablet Take 25 mg by mouth daily 10/25/21  Yes Historical Provider, MD   Biotin 5000 MCG CAPS Take by mouth daily   Yes Historical Provider, MD   sevelamer hcl (RENAGEL) 800 MG tablet Take 2 capsules by mouth 3 times daily (with meals) Plus I tablet with a snack    Historical Provider, MD   acetaminophen (TYLENOL) 325 MG tablet Take 650 mg by mouth every 6 hours as needed for Pain    Historical Provider, MD   B Complex-C-Folic Acid (RENAL VITAMIN PO) Take 1 capsule by mouth three times a week With dialysis    Historical Provider, MD   Apixaban (ELIQUIS PO) Take 5 mg by mouth in the morning and at bedtime     Historical Provider, MD   cloNIDine (CATAPRES) 0.1 MG tablet Take 0.1 mg by mouth 2 times daily Patient stated she takes 2 x daily    Historical Provider, MD   predniSONE (DELTASONE) 5 MG tablet Take 5 mg by mouth daily    Historical Provider, MD   doxazosin (CARDURA) 1 MG tablet Take 2 mg by mouth 2 times daily     Historical Provider, MD   tacrolimus (PROGRAF) 1 MG capsule Take 1 mg by mouth 2 times daily Indications: two in the AM and three at night    Historical Provider, MD   Cyanocobalamin (VITAMIN B 12 PO) Take 1 tablet by mouth daily  Patient not taking: Reported on 2022    Historical Provider, MD   vitamin D (CHOLECALCIFEROL) 1000 UNIT TABS tablet Take 1,000 Units by mouth once a week Indications: on thursday    Historical Provider, MD   METOPROLOL SUCCINATE ER PO Take 1 tablet by mouth 2 times daily 50 mg    Historical Provider, MD   FAMOTIDINE PO Take 20 tablets by mouth 2 times daily     Historical Provider, MD       Current medications:    Current Facility-Administered Medications   Medication Dose Route Frequency Provider Last Rate Last Admin    0.9 % sodium chloride infusion   IntraVENous Continuous Jeannette Lee  mL/hr at 06/22/22 0633 New Bag at 06/22/22 1822    sodium chloride flush 0.9 % injection 5-40 mL  5-40 mL IntraVENous 2 times per day Jeannette Lee MD        sodium chloride flush 0.9 % injection 5-40 mL  5-40 mL IntraVENous PRN Jeannette Lee MD        0.9 % sodium chloride infusion   IntraVENous PRN Jeannette Lee MD        clindamycin (CLEOCIN) 900 mg in dextrose 5 % 50 mL IVPB  900 mg IntraVENous Once Robin Morrison MD           Allergies: Allergies   Allergen Reactions    Heparin Other (See Comments)     States it clots her blood    Iodine Itching    Penicillins Itching       Problem List:    Patient Active Problem List   Diagnosis Code    Hypertension I10    GERD (gastroesophageal reflux disease) K21.9    Vitamin D deficiency E55.9    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    Pseudophakia Z96.1    Presence of intraocular lens Z96.1    Posterior subcapsular polar age-related cataract, right eye H25.041    Pinguecula, bilateral H11.153    Other and unspecified ovarian cyst N83.209    Body mass index (BMI) of 32.0-32.9 in adult Z68.32    Non-refractory epilepsy ([facility] G40.909    Meibomian gland dysfunction of right eye H02.883    Lacrimal gland dislocation, bilateral lacrimal glands H04.163    Hypertensive kidney disease with ESRD (end-stage renal disease) ([facility]) I12.0, N18.6    Hypertensive kidney disease with chronic kidney disease stage III ([facility]) I12.9, N18.30    Hyperparathyroidism due to renal insufficiency ([facility]) N25.81    Hyperlipemia E78.5    S/P endometrial ablation Z98.890    H/O tubal ligation Z98.51    Heparin-induced thrombocytopenia ([facility]) D75.82    Hemodialysis patient ([facility] Z99.2    End stage renal disease ([facility] N18.6    ARF (acute renal failure) ([facility]) N17.9    Anemia secondary to renal failure N18.9, D63.1    Anemia due to stage 5 chronic kidney disease, not on chronic dialysis ([facility]) N18.5, D63.1    Anemia due to chronic kidney disease, on chronic dialysis ([facility]) N18.6, D63.1, Z99.2    Organ transplant candidate Z76.82    Cortical age-related cataract, bilateral N79.238    Complication of vascular access for dialysis T82. 9XXA    CKD (chronic kidney disease), stage V ([facility]) N18.5    Cervical dysplasia N87.9    Anemia, unspecified D64.9    Acidosis E87.2    Abnormal uterine bleeding N93.9    Current use of long term anticoagulation Z79.01       Past Medical History:        Diagnosis Date    Central venous catheter in place     Right Jugular    [email or web address] 03/05/2021    M, W, F    ESRD (end stage renal disease) ([email or web address]     GERD (gastroesophageal reflux disease) 2/13/2020    Hypertension 2/13/2020    Kidney transplant status        Past Surgical History:        Procedure Laterality Date    DIALYSIS FISTULA CREATION Right 8/12/2021    RIGHT ARM ACCESSORY CEPHALIC TO ANTECUBITAL ULNAR ARTERY FISTULA performed by Tadeo Tinoco MD at [place] [address] Bilateral 2017   [address] Right 11/2014    TUBAL LIGATION         Social History: Social History     Tobacco Use    Smoking status: Never Smoker    Smokeless tobacco: Never Used   Substance Use Topics    Alcohol use: No                                Counseling given: Not Answered      Vital Signs (Current):   Vitals:    06/20/22 0914 06/22/22 0607 06/22/22 0614 06/22/22 0630   BP:   (!) 151/109 (!) 169/105   Pulse:   82    Resp:   18    Temp:   97.1 °F (36.2 °C)    TempSrc:   Temporal    SpO2:   97%    Weight: 158 lb (71.7 kg) 160 lb (72.6 kg)     Height: 5' 2\" (1.575 m) 5' 2\" (1.575 m)                                                BP Readings from Last 3 Encounters:   06/22/22 (!) 169/105   04/05/22 (!) 142/82   09/03/21 118/70       NPO Status: Time of last liquid consumption: 2030                        Time of last solid consumption: 2030                        Date of last liquid consumption: 06/21/22                        Date of last solid food consumption: 06/21/22    BMI:   Wt Readings from Last 3 Encounters:   06/22/22 160 lb (72.6 kg)   04/05/22 158 lb (71.7 kg)   09/03/21 181 lb (82.1 kg)     Body mass index is 29.26 kg/m². CBC:   Lab Results   Component Value Date    WBC 5.5 08/12/2021    RBC 3.74 08/12/2021    HGB 11.8 08/12/2021    HCT 36.7 08/12/2021    MCV 98.1 08/12/2021    RDW 16.7 08/12/2021     08/12/2021       CMP:   Lab Results   Component Value Date    K 4.8 06/22/2022       POC Tests: No results for input(s): POCGLU, POCNA, POCK, POCCL, POCBUN, POCHEMO, POCHCT in the last 72 hours.     Coags: No results found for: PROTIME, INR, APTT    HCG (If Applicable): No results found for: PREGTESTUR, PREGSERUM, HCG, HCGQUANT     ABGs: No results found for: PHART, PO2ART, GUG5EKY, IBG6YOB, BEART, H3LOEUGM     Type & Screen (If Applicable):  No results found for: LABABO, LABRH    Drug/Infectious Status (If Applicable):  No results found for: HIV, HEPCAB    COVID-19 Screening (If Applicable): No results found for: COVID19        Anesthesia Evaluation  Patient summary

## 2022-06-22 NOTE — PROGRESS NOTES
Received from PACU. Admitted to Phase 2 care. Awake and alert, respirations easy and even. Oriented to room and surroundings. Continues with complaints of pain.

## 2022-06-22 NOTE — PROGRESS NOTES
Large emesis of pepsi colored with cookie particles. States came on all of a sudden. States ride is here and ready to go. Given emesis bags to travel.

## 2022-06-22 NOTE — PROGRESS NOTES
Tolerating oral intake and being up in chair. Assisted with dressing. States pain continues, but she is ready to go home. Awaiting ride.

## 2022-06-23 NOTE — OP NOTE
[address] [place] [address]                                OPERATIVE REPORT    PATIENT NAME: Lolis Rodriguez                    :        1975  MED REC NO:   [id number]                          ROOM:  ACCOUNT NO:   [de-identified]                           ADMIT DATE: 2022  PROVIDER:     Manjinder Estevez MD      DATE OF PROCEDURE:  2022    SURGEON:  Rosalva Ambriz. Rambo Key MD    ASSISTANTS:  Ronda Maza and Shelbie Thomas. ANESTHESIA:  General endotracheal plus Marcaine. ESTIMATED BLOOD LOSS:  50 mL. INDICATIONS:  This is a very pleasant 47-year-old black female, born  1975. She is on dialysis, and she goes to [facility] on  Monday, Wednesday, Friday. 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 So, she is here for repair  and resection of her aneurysm on the wrist of the radial artery and then  a new access, a [place]-Juan Manuel loop from the ulnar artery above the elbow to  an axillary vein. OPERATIVE PROCEDURE:  The patient was placed on the table and after  general anesthesia, we ultrasounded the area, identified that there were  two arteries at the elbow, the ulnar and radial, and previous evaluation  of the ulnar was thought to be bigger even though it was deeper. We  looked for vein in the axilla and found two. There was a superficial  one about 4 mm deep, one that may have been a little bigger and this was  high up to the axillary line. We planned to use the superficial one. Then, we evaluated the aneurysm on the radial artery, could see the  outflow radial, marked it easily. The inflow radial was not easy to see  because of large aneurysm. The aneurysm had been evaluated by the  ultrasound and was found to be surprisingly large. It was 3.9 x 2 x 2.8  cm.   Any way, we marked this as best we could, then prepped and draped  in the usual sterile fashion, gave antibiotics, A-boots in place and  working. She also has a heparin allergy, so our plan was to use  argatroban. So, we started at the wrist and made a longitudinal  incision through the old incision, worked and got around part of the  aneurysm. This was odd shaped. I kind of expected an outflow vein; we  never found, it was just a big ball. Most of it was clotted but we  worked our way around, got distal control of the radial, proximal  control of the ulna, was difficult but after we dissected out most of  the aneurysm, then we could see where it went in and got around this  with vessel loops. We left this alone and went to the vein next into  the axillary area, infiltrated with Marcaine, carried down through the  skin, subcutaneous fatty tissue, multiple major structures there. Superficially, there was vein, it was a good 4 mm, got vessel loops  around it and got ready to go. Then, we went to the antecubital area,  infiltrated with local, made a new incision, carried down through the  skin, subcutaneous fatty tissue. The radial artery was first  identified, it was superficial.  It was very tortuous, S shaped and then  we identified the ulnar which was deeper, dissected around it. It did  not really look that much bigger, especially after being dissected free  but we got control with vessel loops. We then made a counterincision  and used a Forever tunneler to pull the [place]-Juan Manuel, so we used  non-Propaten since she is allergic to heparin, pulled it through the  tunnel and then gave argatroban 5 mg, waited 9 minutes and then started  with our arterial anastomosis. We then did the arterial anastomosis  with 5-0 Hemo-Seal, had some bleeders here. The artery was thin and not  very big.   We flushed it retrograde with saline and then clamped at the  counterincision, then did the venous side, difficult on the venous side,  very thin vein once we opened it, but we did with 5-0 Hemo-Seal did not  even parachute six rows because we thought it would pull through, did  four. Again, we had to work carefully to get it to work, added about  three deep stitches on the far side to get this to stop bleeding. Then  we got it working. We then went to put some Fibrillar on both of these  and went distally, clamped the radial artery proximally and distally and  resected the aneurysm. I used endarterectomy spatula to take some of  the clot out to get it smooth and then closed it primarily with 6-0  Prolene starting at each end and running toward the middle. When we had  flushed the graft for the second anastomosis, we found some clot in it,  so we gave 3 more milligrams of argatroban and then continued. Once all  the field was dry, we closed all four incisions with 3-0 Vicryl and 4-0  Vicryl subcuticular and Skin Affix glue, an Ace wrap was applied. The  patient tolerated the procedure well. John Steele MD    D: 06/22/2022 12:55:28       T: 06/22/2022 15:26:26     [id number]  Job#: [id number]     Doc#: [id number]    CC:  Rayburn Felty, MD Candie Fordyce, MD Frieda Coles.  DO [place]

## 2022-06-27 ENCOUNTER — TELEPHONE (OUTPATIENT)
Dept: SURGERY | Age: 47
End: 2022-06-27

## 2022-06-27 NOTE — TELEPHONE ENCOUNTER
I talked with Mehrdad Avalos and told her she could take the wrap off for awhile, then rewrap her hand and arm to help keep swelling down. She has a post op on July 5.

## 2022-07-05 ENCOUNTER — OFFICE VISIT (OUTPATIENT)
Dept: VASCULAR SURGERY | Age: 47
End: 2022-07-05

## 2022-07-05 VITALS — SYSTOLIC BLOOD PRESSURE: 118 MMHG | WEIGHT: 160 LBS | BODY MASS INDEX: 29.26 KG/M2 | DIASTOLIC BLOOD PRESSURE: 82 MMHG

## 2022-07-05 DIAGNOSIS — Z99.2 ANEMIA DUE TO CHRONIC KIDNEY DISEASE, ON CHRONIC DIALYSIS (HCC): ICD-10-CM

## 2022-07-05 DIAGNOSIS — T82.9XXD COMPLICATION OF VASCULAR ACCESS FOR DIALYSIS, SUBSEQUENT ENCOUNTER: ICD-10-CM

## 2022-07-05 DIAGNOSIS — D63.1 ANEMIA DUE TO CHRONIC KIDNEY DISEASE, ON CHRONIC DIALYSIS (HCC): ICD-10-CM

## 2022-07-05 DIAGNOSIS — N18.6 ANEMIA DUE TO CHRONIC KIDNEY DISEASE, ON CHRONIC DIALYSIS (HCC): ICD-10-CM

## 2022-07-05 DIAGNOSIS — D75.829 HEPARIN-INDUCED THROMBOCYTOPENIA: Primary | ICD-10-CM

## 2022-07-05 DIAGNOSIS — Z79.01 CURRENT USE OF LONG TERM ANTICOAGULATION: ICD-10-CM

## 2022-07-05 PROBLEM — N18.9 ACUTE RENAL FAILURE SUPERIMPOSED ON CHRONIC KIDNEY DISEASE, ON CHRONIC DIALYSIS (HCC): Status: ACTIVE | Noted: 2018-10-22

## 2022-07-05 PROCEDURE — 99024 POSTOP FOLLOW-UP VISIT: CPT | Performed by: SURGERY

## 2022-07-05 NOTE — PROGRESS NOTES
Daily Progress Note   Arielle Almanza MD      7/5/2022    Chief Complaint   Patient presents with    Post-Op Check     S/p Right brachial artery to axillary vein graft and repair of radial artery aneurysm, 6/22. Pt states she is healing well, arm remains swollen, denies pain, dialysis did access fistula yesterday which was painful for the patient. HISTORY OF PRESENT ILLNESS:                The patient is a 50 y.o. female who presents with a 13 day post op visit for right arm brachial artery to axillary vein graft and repair of radial artery aneurysm. Ms. Phoenix Earl says the graft worked fine at dialysis yesterday. Dialysis called to see if they could use the graft as her tunnel line wasn't working.      Past Medical History:   Diagnosis Date    Central venous catheter in place     Right Jugular    Dialysis patient [facility]) 03/05/2021    M, W, F    ESRD (end stage renal disease) ([facility] [address]     GERD (gastroesophageal reflux disease) 2/13/2020    Hypertension 2/13/2020    Kidney transplant status        Past Surgical History:   Procedure Laterality Date    DIALYSIS FISTULA CREATION Right 8/12/2021    RIGHT ARM ACCESSORY CEPHALIC TO ANTECUBITAL ULNAR ARTERY FISTULA performed by Giorgio Fitzpatrick MD at [address] [place] [address] Right 6/22/2022    RIGHT BRACHIAL ARTERY TO AXILLARY VEIN GRAFT AND REPAIR OF RADIAL ARTERY ANEURYSM performed by Giorgio Fitzpatrick MD at [address] [place] [address] Bilateral 2017    KIDNEY TRANSPLANT Right 11/2014    TUBAL LIGATION         Social History     Socioeconomic History    Marital status: Single     Spouse name: Not on file    Number of children: Not on file    Years of education: Not on file    Highest education level: Not on file   Occupational History    Not on file   Tobacco Use    Smoking status: Never Smoker    Smokeless tobacco: Never Used   Vaping Use    Vaping Use: Never used   Substance and Sexual Activity    Alcohol use: No    Drug use: No    Sexual activity: Yes     Partners: Male   Other Topics Concern    Not on file   Social History Narrative    Not on file     Social Determinants of Health     Financial Resource Strain:     Difficulty of Paying Living Expenses: Not on file   Food Insecurity:     Worried About Running Out of Food in the Last Year: Not on file    Yoni of Food in the Last Year: Not on file   Transportation Needs:     Lack of Transportation (Medical): Not on file    Lack of Transportation (Non-Medical):  Not on file   Physical Activity:     Days of Exercise per Week: Not on file    Minutes of Exercise per Session: Not on file   Stress:     Feeling of Stress : Not on file   Social Connections:     Frequency of Communication with Friends and Family: Not on file    Frequency of Social Gatherings with Friends and Family: Not on file    Attends [employer] Services: Not on file    Active Member of [address] [place] [address] [employer] or Organizations: Not on file    Attends Club or Organization Meetings: Not on file    Marital Status: Not on file   Intimate Partner Violence:     Fear of Current or Ex-Partner: Not on file    Emotionally Abused: Not on file    Physically Abused: Not on file    Sexually Abused: Not on file   Housing Stability:     Unable to Pay for Housing in the Last Year: Not on file    Number of [place] in the Last Year: Not on file    Unstable Housing in the Last Year: Not on file       Family History   Problem Relation Age of Onset    Heart Attack Father          Current Outpatient Medications:     NIFEdipine (PROCARDIA PO), Take by mouth, Disp: , Rfl:     Magnesium 500 MG CAPS, Take 1 tablet by mouth daily, Disp: , Rfl:     spironolactone (ALDACTONE) 25 MG tablet, Take 25 mg by mouth daily, Disp: , Rfl:     Biotin 5000 MCG CAPS, Take by mouth daily, Disp: , Rfl:     sevelamer hcl (RENAGEL) 800 MG tablet, Take 2 capsules by mouth 3 times daily (with meals) Plus I tablet with a snack, Disp: , Rfl:    acetaminophen (TYLENOL) 325 MG tablet, Take 650 mg by mouth every 6 hours as needed for Pain, Disp: , Rfl:     B Complex-C-Folic Acid (RENAL VITAMIN PO), Take 1 capsule by mouth three times a week With dialysis, Disp: , Rfl:     Apixaban (ELIQUIS PO), Take 5 mg by mouth in the morning and at bedtime , Disp: , Rfl:     cloNIDine (CATAPRES) 0.1 MG tablet, Take 0.1 mg by mouth 2 times daily Patient stated she takes 2 x daily, Disp: , Rfl:     predniSONE (DELTASONE) 5 MG tablet, Take 5 mg by mouth daily, Disp: , Rfl:     doxazosin (CARDURA) 1 MG tablet, Take 2 mg by mouth 2 times daily , Disp: , Rfl:     tacrolimus (PROGRAF) 1 MG capsule, Take 1 mg by mouth 2 times daily Indications: two in the AM and three at night, Disp: , Rfl:     Cyanocobalamin (VITAMIN B 12 PO), Take 1 tablet by mouth daily , Disp: , Rfl:     vitamin D (CHOLECALCIFEROL) 1000 UNIT TABS tablet, Take 1,000 Units by mouth once a week Indications: on thursday, Disp: , Rfl:     METOPROLOL SUCCINATE ER PO, Take 1 tablet by mouth 2 times daily 50 mg, Disp: , Rfl:     FAMOTIDINE PO, Take 20 tablets by mouth 2 times daily , Disp: , Rfl:     Heparin, Iodine, and Penicillins    Vitals:    07/05/22 0939   BP: 118/82   Weight: 160 lb (72.6 kg)       Admission on 06/22/2022, Discharged on 06/22/2022   Component Date Value Ref Range Status    Potassium, Whole Blood 06/22/2022 4.8  3.5 - 5.1 mmol/L Final       Review of Systems   Constitutional: Negative. HENT: Negative. Eyes: Negative. Respiratory: Negative. Cardiovascular: Negative. Gastrointestinal: Negative. Endocrine: Negative. Genitourinary: Negative. Musculoskeletal: Negative. Skin: Negative. Allergic/Immunologic: Negative. Neurological: Negative. Hematological: Negative. Psychiatric/Behavioral: Negative. All other systems reviewed and are negative. Physical Exam  Vitals and nursing note reviewed. Constitutional:       Appearance: Normal appearance. She is well-developed. HENT:      Mouth/Throat:      Pharynx: No oropharyngeal exudate. Eyes:      Conjunctiva/sclera: Conjunctivae normal.      Pupils: Pupils are equal, round, and reactive to light. Cardiovascular:      Rate and Rhythm: Normal rate and regular rhythm. Pulses:           Carotid pulses are 2+ on the right side and 2+ on the left side. Radial pulses are 2+ on the right side and 2+ on the left side. Femoral pulses are 2+ on the right side and 2+ on the left side. Dorsalis pedis pulses are 0 on the right side and 2+ on the left side. Posterior tibial pulses are 2+ on the right side and 2+ on the left side. Heart sounds: Normal heart sounds. Comments:   Doppler 4/5/22:  Rt DP:   Rt PT: biphasic  Rt AT: biphasic    Lt DP: biphasic (lateral)  Lt PT: monophasic  Lt AT:      7/16/21  Right ulnar: +2  Co-dominant Anthony test    Doppler 7/16/2021  Rt DP: none found  Rt PT: triphasic  Rt AT: triphasic    Lt DP: triphasic (lateral)  Lt PT: triphasic  Lt AT: not checked    Pulmonary:      Effort: Pulmonary effort is normal.      Breath sounds: Normal breath sounds. Chest:       Abdominal:      General: Bowel sounds are normal.       Musculoskeletal:         General: Normal range of motion. Arms:       Cervical back: Normal range of motion. Comments:   4/5/22  Part of original fistula, no bruit, no thrill. 3.0 cm wide x 2.0 cm long x 0.6 cm high   Neurological:      Mental Status: She is alert and oriented to person, place, and time. Deep Tendon Reflexes: Reflexes are normal and symmetric. Psychiatric:         Speech: Speech normal.         Behavior: Behavior normal.         Thought Content:  Thought content normal.         Judgment: Judgment normal.           ASSESSMENT:    Problem List Items Addressed This Visit        Unprioritized    Heparin-induced thrombocytopenia ([facility] [address] - Primary    Complication of vascular access for dialysis    Anemia due to chronic kidney disease, on chronic dialysis [facility])    Current use of long term anticoagulation          PLAN:    Return if needed. Aziza Alegria MA am scribing for and in the presence of Frida Shaffer MD on this date of 07/05/22    I Jeremi Barker MD personally performed the services described in this documentation as scribed by the Medical Assistant Aaron Thomas in my presence and it is both accurate and complete.         Electronically signed by Frida Shaffer MD on 7/5/2022 at 4:29 PM

## 2023-03-03 ENCOUNTER — TELEPHONE (OUTPATIENT)
Dept: SURGERY | Age: 48
End: 2023-03-03

## 2023-03-03 NOTE — TELEPHONE ENCOUNTER
This pt is a Dr. Montrell Sandoval pt, has had sx with him in the past, only needs to return as needed per last note, if she needs to see Dr. Amanda Webb she will need to call to schedule a NP appt. Tried calling pt.

## 2023-03-03 NOTE — TELEPHONE ENCOUNTER
Jose Titus, [facility] DR RATNA SARMIENTO called to have you call patient and set up appointment for her.

## 2023-03-08 NOTE — TELEPHONE ENCOUNTER
Appointment scheduled with Dr Belkis Olvera. Spoke to [employer] at [employer]. Reports more aneurysms around graft.

## 2023-03-16 ENCOUNTER — OFFICE VISIT (OUTPATIENT)
Dept: VASCULAR SURGERY | Age: 48
End: 2023-03-16
Payer: COMMERCIAL

## 2023-03-16 VITALS — BODY MASS INDEX: 25.76 KG/M2 | WEIGHT: 140 LBS | HEIGHT: 62 IN

## 2023-03-16 DIAGNOSIS — T82.898A ANEURYSM OF ARTERIOVENOUS DIALYSIS FISTULA, INITIAL ENCOUNTER (HCC): Primary | ICD-10-CM

## 2023-03-16 PROCEDURE — G8417 CALC BMI ABV UP PARAM F/U: HCPCS | Performed by: STUDENT IN AN ORGANIZED HEALTH CARE EDUCATION/TRAINING PROGRAM

## 2023-03-16 PROCEDURE — G8427 DOCREV CUR MEDS BY ELIG CLIN: HCPCS | Performed by: STUDENT IN AN ORGANIZED HEALTH CARE EDUCATION/TRAINING PROGRAM

## 2023-03-16 PROCEDURE — G8484 FLU IMMUNIZE NO ADMIN: HCPCS | Performed by: STUDENT IN AN ORGANIZED HEALTH CARE EDUCATION/TRAINING PROGRAM

## 2023-03-16 PROCEDURE — 1036F TOBACCO NON-USER: CPT | Performed by: STUDENT IN AN ORGANIZED HEALTH CARE EDUCATION/TRAINING PROGRAM

## 2023-03-16 PROCEDURE — 99214 OFFICE O/P EST MOD 30 MIN: CPT | Performed by: STUDENT IN AN ORGANIZED HEALTH CARE EDUCATION/TRAINING PROGRAM

## 2023-03-16 PROCEDURE — 3017F COLORECTAL CA SCREEN DOC REV: CPT | Performed by: STUDENT IN AN ORGANIZED HEALTH CARE EDUCATION/TRAINING PROGRAM

## 2023-03-16 NOTE — PROGRESS NOTES
Grupo Pham (:  1975) is a 46 y.o. female,Established patient, here for evaluation of the following chief complaint(s):  Consultation and Vascular Access Problem (M,W,F; [place]; left hand dominant)         ASSESSMENT/PLAN:  1. Aneurysm of arteriovenous dialysis fistula, initial encounter [facility])    This is a 46year old female patient who presents for dialysis graft aneurysms. Her access is currently functioning therefore counseled against immediate change of the graft to remove the aneurysms. However she is concerned about her personal appearance and is unable to live her life to the fullest stating this has reduced her quality of life. Removal of such a large section of the graft will render the graft unable to be used for several weeks while it heals and scars into place. Will therefore have to place a temporary TDC for dialysis to occur. She understands. All risks including: pain, infection, bleeding, thrombosis, aneurysm formation, MI, stroke, death, respiratory failure reviewed with patient who understands and is willing to proceed. All questions answered. HAS HEPARIN ALLERGY/TOBIN. Subjective   SUBJECTIVE/OBJECTIVE:  This is a 46year old female patient who presents to the office today for evaluation of right upper extremity AV graft aneurysm. The patient has had aneurysms at prior access sites in the past; one of which was treated in  of last year. She now presents to discuss mid graft aneurysms. In discussing with the patient apparently she sticks her own graft and sticks the graft from a direct inferior to superior projection right in the area of the largest outpouching of the graft. She states that she chooses to stick the graft herself because she wants to learn how to do home hemodialysis. She however is self-conscious about the size of the aneurysms and would like them removed.  She recently had a fistulagram within the last couple of months with a declot but otherwise the access does work. Objective   Physical Exam  Cardiovascular:      Rate and Rhythm: Normal rate and regular rhythm. Comments: Palp right AV graft thrill  Skin:     General: Skin is warm and dry. Comments: Outpouchings consistent with graft aneurysms in the mid portion of the graft around the puncture sites   Neurological:      Mental Status: She is alert.         Carleen Alexis DO, FACS, FSVS, [address] [facility] Vascular and Endovascular Surgery

## 2023-03-24 ENCOUNTER — TELEPHONE (OUTPATIENT)
Dept: VASCULAR SURGERY | Age: 48
End: 2023-03-24

## 2023-04-13 ENCOUNTER — TELEPHONE (OUTPATIENT)
Dept: SURGERY | Age: 48
End: 2023-04-13

## 2023-04-19 ENCOUNTER — TELEPHONE (OUTPATIENT)
Dept: VASCULAR SURGERY | Age: 48
End: 2023-04-19

## 2023-04-19 NOTE — TELEPHONE ENCOUNTER
Clearance obtained. Scanned to media. Copy faxed to [facility]. Message on VM for pt to call back to discuss dates. Possibly 5-2-23.

## 2023-05-03 ENCOUNTER — TELEPHONE (OUTPATIENT)
Dept: SURGERY | Age: 48
End: 2023-05-03

## 2023-05-22 RX ORDER — ONDANSETRON 4 MG/1
4 TABLET, FILM COATED ORAL EVERY 8 HOURS PRN
COMMUNITY

## 2023-05-22 RX ORDER — FAMOTIDINE 20 MG/1
20 TABLET, FILM COATED ORAL 2 TIMES DAILY
COMMUNITY

## 2023-05-22 RX ORDER — METOPROLOL TARTRATE 100 MG/1
100 TABLET ORAL 2 TIMES DAILY
COMMUNITY

## 2023-05-22 RX ORDER — DILTIAZEM HYDROCHLORIDE 240 MG/1
360 CAPSULE, EXTENDED RELEASE ORAL DAILY
COMMUNITY

## 2023-05-22 RX ORDER — CALCITRIOL 0.25 UG/1
0.25 CAPSULE, LIQUID FILLED ORAL EVERY OTHER DAY
COMMUNITY

## 2023-05-26 ENCOUNTER — ANESTHESIA EVENT (OUTPATIENT)
Dept: OPERATING ROOM | Age: 48
End: 2023-05-26
Payer: COMMERCIAL

## 2023-05-30 ENCOUNTER — APPOINTMENT (OUTPATIENT)
Dept: GENERAL RADIOLOGY | Age: 48
End: 2023-05-30
Attending: STUDENT IN AN ORGANIZED HEALTH CARE EDUCATION/TRAINING PROGRAM
Payer: COMMERCIAL

## 2023-05-30 ENCOUNTER — ANESTHESIA (OUTPATIENT)
Dept: OPERATING ROOM | Age: 48
End: 2023-05-30
Payer: COMMERCIAL

## 2023-05-30 ENCOUNTER — HOSPITAL ENCOUNTER (OUTPATIENT)
Age: 48
Setting detail: OUTPATIENT SURGERY
Discharge: HOME | End: 2023-05-30
Attending: STUDENT IN AN ORGANIZED HEALTH CARE EDUCATION/TRAINING PROGRAM | Admitting: STUDENT IN AN ORGANIZED HEALTH CARE EDUCATION/TRAINING PROGRAM
Payer: COMMERCIAL

## 2023-05-30 VITALS
OXYGEN SATURATION: 97 % | RESPIRATION RATE: 21 BRPM | BODY MASS INDEX: 25.78 KG/M2 | SYSTOLIC BLOOD PRESSURE: 148 MMHG | TEMPERATURE: 97 F | DIASTOLIC BLOOD PRESSURE: 97 MMHG | WEIGHT: 140.1 LBS | HEIGHT: 62 IN | HEART RATE: 79 BPM

## 2023-05-30 DIAGNOSIS — G89.18 POST-OP PAIN: Primary | ICD-10-CM

## 2023-05-30 PROBLEM — T82.510A PSEUDOANEURYSM OF ARTERIOVENOUS DIALYSIS FISTULA (HCC): Status: ACTIVE | Noted: 2023-05-30

## 2023-05-30 LAB
ANION GAP SERPL CALCULATED.3IONS-SCNC: 16 MMOL/L (ref 3–16)
BUN SERPL-MCNC: 24 MG/DL (ref 7–20)
CALCIUM SERPL-MCNC: 8.8 MG/DL (ref 8.3–10.6)
CHLORIDE SERPL-SCNC: 101 MMOL/L (ref 99–110)
CO2 SERPL-SCNC: 23 MMOL/L (ref 21–32)
CREAT SERPL-MCNC: 8.5 MG/DL (ref 0.6–1.1)
DEPRECATED RDW RBC AUTO: 16.9 % (ref 12.4–15.4)
GFR SERPLBLD CREATININE-BSD FMLA CKD-EPI: 5 ML/MIN/{1.73_M2}
GLUCOSE BLD-MCNC: 117 MG/DL (ref 70–99)
GLUCOSE SERPL-MCNC: 101 MG/DL (ref 70–99)
HCT VFR BLD AUTO: 38.1 % (ref 36–48)
HGB BLD-MCNC: 12.5 G/DL (ref 12–16)
MCH RBC QN AUTO: 28.1 PG (ref 26–34)
MCHC RBC AUTO-ENTMCNC: 32.9 G/DL (ref 31–36)
MCV RBC AUTO: 85.4 FL (ref 80–100)
PERFORMED ON: ABNORMAL
PLATELET # BLD AUTO: 120 K/UL (ref 135–450)
PMV BLD AUTO: 9.7 FL (ref 5–10.5)
POTASSIUM BLD-SCNC: 3.6 MMOL/L (ref 3.5–5.1)
POTASSIUM SERPL-SCNC: 3.8 MMOL/L (ref 3.5–5.1)
RBC # BLD AUTO: 4.46 M/UL (ref 4–5.2)
SODIUM SERPL-SCNC: 140 MMOL/L (ref 136–145)
WBC # BLD AUTO: 6.8 K/UL (ref 4–11)

## 2023-05-30 PROCEDURE — 6360000002 HC RX W HCPCS

## 2023-05-30 PROCEDURE — 2580000003 HC RX 258: Performed by: STUDENT IN AN ORGANIZED HEALTH CARE EDUCATION/TRAINING PROGRAM

## 2023-05-30 PROCEDURE — 2500000003 HC RX 250 WO HCPCS: Performed by: STUDENT IN AN ORGANIZED HEALTH CARE EDUCATION/TRAINING PROGRAM

## 2023-05-30 PROCEDURE — C1768 GRAFT, VASCULAR: HCPCS | Performed by: STUDENT IN AN ORGANIZED HEALTH CARE EDUCATION/TRAINING PROGRAM

## 2023-05-30 PROCEDURE — 6360000002 HC RX W HCPCS: Performed by: STUDENT IN AN ORGANIZED HEALTH CARE EDUCATION/TRAINING PROGRAM

## 2023-05-30 PROCEDURE — 6370000000 HC RX 637 (ALT 250 FOR IP): Performed by: STUDENT IN AN ORGANIZED HEALTH CARE EDUCATION/TRAINING PROGRAM

## 2023-05-30 PROCEDURE — 85027 COMPLETE CBC AUTOMATED: CPT

## 2023-05-30 PROCEDURE — A4217 STERILE WATER/SALINE, 500 ML: HCPCS | Performed by: STUDENT IN AN ORGANIZED HEALTH CARE EDUCATION/TRAINING PROGRAM

## 2023-05-30 PROCEDURE — 7100000011 HC PHASE II RECOVERY - ADDTL 15 MIN: Performed by: STUDENT IN AN ORGANIZED HEALTH CARE EDUCATION/TRAINING PROGRAM

## 2023-05-30 PROCEDURE — C1894 INTRO/SHEATH, NON-LASER: HCPCS | Performed by: STUDENT IN AN ORGANIZED HEALTH CARE EDUCATION/TRAINING PROGRAM

## 2023-05-30 PROCEDURE — C1750 CATH, HEMODIALYSIS,LONG-TERM: HCPCS | Performed by: STUDENT IN AN ORGANIZED HEALTH CARE EDUCATION/TRAINING PROGRAM

## 2023-05-30 PROCEDURE — 77001 FLUOROGUIDE FOR VEIN DEVICE: CPT

## 2023-05-30 PROCEDURE — 3600000014 HC SURGERY LEVEL 4 ADDTL 15MIN: Performed by: STUDENT IN AN ORGANIZED HEALTH CARE EDUCATION/TRAINING PROGRAM

## 2023-05-30 PROCEDURE — 71045 X-RAY EXAM CHEST 1 VIEW: CPT

## 2023-05-30 PROCEDURE — 3600000004 HC SURGERY LEVEL 4 BASE: Performed by: STUDENT IN AN ORGANIZED HEALTH CARE EDUCATION/TRAINING PROGRAM

## 2023-05-30 PROCEDURE — 7100000000 HC PACU RECOVERY - FIRST 15 MIN: Performed by: STUDENT IN AN ORGANIZED HEALTH CARE EDUCATION/TRAINING PROGRAM

## 2023-05-30 PROCEDURE — 3700000001 HC ADD 15 MINUTES (ANESTHESIA): Performed by: STUDENT IN AN ORGANIZED HEALTH CARE EDUCATION/TRAINING PROGRAM

## 2023-05-30 PROCEDURE — 2500000003 HC RX 250 WO HCPCS

## 2023-05-30 PROCEDURE — 7100000010 HC PHASE II RECOVERY - FIRST 15 MIN: Performed by: STUDENT IN AN ORGANIZED HEALTH CARE EDUCATION/TRAINING PROGRAM

## 2023-05-30 PROCEDURE — 80048 BASIC METABOLIC PNL TOTAL CA: CPT

## 2023-05-30 PROCEDURE — 2709999900 HC NON-CHARGEABLE SUPPLY: Performed by: STUDENT IN AN ORGANIZED HEALTH CARE EDUCATION/TRAINING PROGRAM

## 2023-05-30 PROCEDURE — C1769 GUIDE WIRE: HCPCS | Performed by: STUDENT IN AN ORGANIZED HEALTH CARE EDUCATION/TRAINING PROGRAM

## 2023-05-30 PROCEDURE — 2580000003 HC RX 258: Performed by: ANESTHESIOLOGY

## 2023-05-30 PROCEDURE — 3700000000 HC ANESTHESIA ATTENDED CARE: Performed by: STUDENT IN AN ORGANIZED HEALTH CARE EDUCATION/TRAINING PROGRAM

## 2023-05-30 PROCEDURE — 84132 ASSAY OF SERUM POTASSIUM: CPT

## 2023-05-30 PROCEDURE — 7100000001 HC PACU RECOVERY - ADDTL 15 MIN: Performed by: STUDENT IN AN ORGANIZED HEALTH CARE EDUCATION/TRAINING PROGRAM

## 2023-05-30 DEVICE — 15.5F X 28CM HEMO-FLOW®XF DOUBLE LUMEN CATHETER FULLDOUBLE SET (CUFF 23CM FROM TIP)SET (CUF
Type: IMPLANTABLE DEVICE | Site: CHEST | Status: FUNCTIONAL
Brand: MEDCOMP HEMO-FLOW DOUBLE LUMEN CATHETERMEDCOMP HEMO-FLOW DOUBLE LUMEN CATHETER

## 2023-05-30 DEVICE — GRAFT VASC L30CM ID6MM BOV CAR ART CLLGN FOR FUNC HAD ACCS: Type: IMPLANTABLE DEVICE | Site: ARM | Status: FUNCTIONAL

## 2023-05-30 RX ORDER — SODIUM CHLORIDE 0.9 % (FLUSH) 0.9 %
5-40 SYRINGE (ML) INJECTION PRN
Status: DISCONTINUED | OUTPATIENT
Start: 2023-05-30 | End: 2023-05-30 | Stop reason: HOSPADM

## 2023-05-30 RX ORDER — HYDROCODONE BITARTRATE AND ACETAMINOPHEN 5; 325 MG/1; MG/1
1 TABLET ORAL ONCE
Status: COMPLETED | OUTPATIENT
Start: 2023-05-30 | End: 2023-05-30

## 2023-05-30 RX ORDER — SODIUM CHLORIDE 0.9 % (FLUSH) 0.9 %
5-40 SYRINGE (ML) INJECTION PRN
Status: DISCONTINUED | OUTPATIENT
Start: 2023-05-30 | End: 2023-05-30 | Stop reason: SDUPTHER

## 2023-05-30 RX ORDER — SODIUM CHLORIDE 0.9 % (FLUSH) 0.9 %
5-40 SYRINGE (ML) INJECTION EVERY 12 HOURS SCHEDULED
Status: DISCONTINUED | OUTPATIENT
Start: 2023-05-30 | End: 2023-05-30 | Stop reason: HOSPADM

## 2023-05-30 RX ORDER — OXYCODONE HYDROCHLORIDE 5 MG/1
5 TABLET ORAL EVERY 6 HOURS PRN
Qty: 28 TABLET | Refills: 0 | Status: SHIPPED | OUTPATIENT
Start: 2023-05-30 | End: 2023-06-06

## 2023-05-30 RX ORDER — VANCOMYCIN HYDROCHLORIDE 1 G/20ML
INJECTION, POWDER, LYOPHILIZED, FOR SOLUTION INTRAVENOUS PRN
Status: DISCONTINUED | OUTPATIENT
Start: 2023-05-30 | End: 2023-05-30 | Stop reason: SDUPTHER

## 2023-05-30 RX ORDER — HYDRALAZINE HYDROCHLORIDE 20 MG/ML
10 INJECTION INTRAMUSCULAR; INTRAVENOUS
Status: DISCONTINUED | OUTPATIENT
Start: 2023-05-30 | End: 2023-05-30 | Stop reason: HOSPADM

## 2023-05-30 RX ORDER — ARGATROBAN 1 MG/ML
INJECTION, SOLUTION INTRAVENOUS PRN
Status: DISCONTINUED | OUTPATIENT
Start: 2023-05-30 | End: 2023-05-30 | Stop reason: SDUPTHER

## 2023-05-30 RX ORDER — ONDANSETRON 2 MG/ML
INJECTION INTRAMUSCULAR; INTRAVENOUS PRN
Status: DISCONTINUED | OUTPATIENT
Start: 2023-05-30 | End: 2023-05-30 | Stop reason: SDUPTHER

## 2023-05-30 RX ORDER — PROPOFOL 10 MG/ML
INJECTION, EMULSION INTRAVENOUS PRN
Status: DISCONTINUED | OUTPATIENT
Start: 2023-05-30 | End: 2023-05-30 | Stop reason: SDUPTHER

## 2023-05-30 RX ORDER — LIDOCAINE HYDROCHLORIDE 20 MG/ML
INJECTION, SOLUTION EPIDURAL; INFILTRATION; INTRACAUDAL; PERINEURAL PRN
Status: DISCONTINUED | OUTPATIENT
Start: 2023-05-30 | End: 2023-05-30 | Stop reason: SDUPTHER

## 2023-05-30 RX ORDER — FLUMAZENIL 0.1 MG/ML
INJECTION INTRAVENOUS
Status: COMPLETED
Start: 2023-05-30 | End: 2023-05-30

## 2023-05-30 RX ORDER — FENTANYL CITRATE 50 UG/ML
25 INJECTION, SOLUTION INTRAMUSCULAR; INTRAVENOUS EVERY 5 MIN PRN
Status: DISCONTINUED | OUTPATIENT
Start: 2023-05-30 | End: 2023-05-30 | Stop reason: HOSPADM

## 2023-05-30 RX ORDER — ROCURONIUM BROMIDE 10 MG/ML
INJECTION, SOLUTION INTRAVENOUS PRN
Status: DISCONTINUED | OUTPATIENT
Start: 2023-05-30 | End: 2023-05-30 | Stop reason: SDUPTHER

## 2023-05-30 RX ORDER — SODIUM CHLORIDE 9 MG/ML
INJECTION, SOLUTION INTRAVENOUS PRN
Status: DISCONTINUED | OUTPATIENT
Start: 2023-05-30 | End: 2023-05-30 | Stop reason: HOSPADM

## 2023-05-30 RX ORDER — LABETALOL HYDROCHLORIDE 5 MG/ML
10 INJECTION, SOLUTION INTRAVENOUS
Status: DISCONTINUED | OUTPATIENT
Start: 2023-05-30 | End: 2023-05-30 | Stop reason: HOSPADM

## 2023-05-30 RX ORDER — MAGNESIUM HYDROXIDE 1200 MG/15ML
LIQUID ORAL CONTINUOUS PRN
Status: COMPLETED | OUTPATIENT
Start: 2023-05-30 | End: 2023-05-30

## 2023-05-30 RX ORDER — SODIUM CHLORIDE 0.9 % (FLUSH) 0.9 %
5-40 SYRINGE (ML) INJECTION EVERY 12 HOURS SCHEDULED
Status: DISCONTINUED | OUTPATIENT
Start: 2023-05-30 | End: 2023-05-30 | Stop reason: SDUPTHER

## 2023-05-30 RX ORDER — FENTANYL CITRATE 50 UG/ML
50 INJECTION, SOLUTION INTRAMUSCULAR; INTRAVENOUS EVERY 5 MIN PRN
Status: DISCONTINUED | OUTPATIENT
Start: 2023-05-30 | End: 2023-05-30 | Stop reason: HOSPADM

## 2023-05-30 RX ORDER — GLYCOPYRROLATE 0.2 MG/ML
INJECTION INTRAMUSCULAR; INTRAVENOUS PRN
Status: DISCONTINUED | OUTPATIENT
Start: 2023-05-30 | End: 2023-05-30 | Stop reason: SDUPTHER

## 2023-05-30 RX ORDER — HYDRALAZINE HYDROCHLORIDE 20 MG/ML
10 INJECTION INTRAMUSCULAR; INTRAVENOUS
Status: COMPLETED | OUTPATIENT
Start: 2023-05-30 | End: 2023-05-30

## 2023-05-30 RX ORDER — DIPHENHYDRAMINE HYDROCHLORIDE 50 MG/ML
INJECTION INTRAMUSCULAR; INTRAVENOUS PRN
Status: DISCONTINUED | OUTPATIENT
Start: 2023-05-30 | End: 2023-05-30 | Stop reason: SDUPTHER

## 2023-05-30 RX ORDER — DEXAMETHASONE SODIUM PHOSPHATE 4 MG/ML
INJECTION, SOLUTION INTRA-ARTICULAR; INTRALESIONAL; INTRAMUSCULAR; INTRAVENOUS; SOFT TISSUE PRN
Status: DISCONTINUED | OUTPATIENT
Start: 2023-05-30 | End: 2023-05-30 | Stop reason: SDUPTHER

## 2023-05-30 RX ORDER — SODIUM CHLORIDE 9 MG/ML
INJECTION, SOLUTION INTRAVENOUS PRN
Status: DISCONTINUED | OUTPATIENT
Start: 2023-05-30 | End: 2023-05-30 | Stop reason: SDUPTHER

## 2023-05-30 RX ORDER — ONDANSETRON 2 MG/ML
4 INJECTION INTRAMUSCULAR; INTRAVENOUS
Status: DISCONTINUED | OUTPATIENT
Start: 2023-05-30 | End: 2023-05-30 | Stop reason: HOSPADM

## 2023-05-30 RX ORDER — MIDAZOLAM HYDROCHLORIDE 1 MG/ML
INJECTION INTRAMUSCULAR; INTRAVENOUS PRN
Status: DISCONTINUED | OUTPATIENT
Start: 2023-05-30 | End: 2023-05-30 | Stop reason: SDUPTHER

## 2023-05-30 RX ORDER — FLUMAZENIL 0.1 MG/ML
INJECTION INTRAVENOUS PRN
Status: DISCONTINUED | OUTPATIENT
Start: 2023-05-30 | End: 2023-05-30 | Stop reason: SDUPTHER

## 2023-05-30 RX ORDER — FENTANYL CITRATE 50 UG/ML
INJECTION, SOLUTION INTRAMUSCULAR; INTRAVENOUS PRN
Status: DISCONTINUED | OUTPATIENT
Start: 2023-05-30 | End: 2023-05-30 | Stop reason: SDUPTHER

## 2023-05-30 RX ORDER — HYDRALAZINE HYDROCHLORIDE 20 MG/ML
10 INJECTION INTRAMUSCULAR; INTRAVENOUS ONCE
Status: COMPLETED | OUTPATIENT
Start: 2023-05-30 | End: 2023-05-30

## 2023-05-30 RX ADMIN — HYDRALAZINE HYDROCHLORIDE 10 MG: 20 INJECTION INTRAMUSCULAR; INTRAVENOUS at 12:56

## 2023-05-30 RX ADMIN — GLYCOPYRROLATE 0.2 MG: 0.2 INJECTION, SOLUTION INTRAMUSCULAR; INTRAVENOUS at 08:06

## 2023-05-30 RX ADMIN — FLUMAZENIL 0.2 MG: 0.1 INJECTION, SOLUTION INTRAVENOUS at 13:01

## 2023-05-30 RX ADMIN — ROCURONIUM BROMIDE 50 MG: 10 INJECTION INTRAVENOUS at 07:40

## 2023-05-30 RX ADMIN — DEXAMETHASONE SODIUM PHOSPHATE 8 MG: 4 INJECTION, SOLUTION INTRAMUSCULAR; INTRAVENOUS at 07:48

## 2023-05-30 RX ADMIN — SODIUM CHLORIDE: 9 INJECTION, SOLUTION INTRAVENOUS at 06:50

## 2023-05-30 RX ADMIN — ROCURONIUM BROMIDE 10 MG: 10 INJECTION INTRAVENOUS at 08:40

## 2023-05-30 RX ADMIN — FENTANYL CITRATE 25 MCG: 50 INJECTION INTRAMUSCULAR; INTRAVENOUS at 09:51

## 2023-05-30 RX ADMIN — VANCOMYCIN HYDROCHLORIDE 1000 MG: 1 INJECTION, POWDER, LYOPHILIZED, FOR SOLUTION INTRAVENOUS at 06:51

## 2023-05-30 RX ADMIN — VANCOMYCIN HYDROCHLORIDE 1000 MG: 1 INJECTION, POWDER, LYOPHILIZED, FOR SOLUTION INTRAVENOUS at 07:51

## 2023-05-30 RX ADMIN — SUGAMMADEX 200 MG: 100 INJECTION, SOLUTION INTRAVENOUS at 09:59

## 2023-05-30 RX ADMIN — HYDRALAZINE HYDROCHLORIDE 10 MG: 20 INJECTION INTRAMUSCULAR; INTRAVENOUS at 11:28

## 2023-05-30 RX ADMIN — DIPHENHYDRAMINE HYDROCHLORIDE 12.5 MG: 50 INJECTION, SOLUTION INTRAMUSCULAR; INTRAVENOUS at 09:25

## 2023-05-30 RX ADMIN — HYDROCORTISONE SODIUM SUCCINATE 100 MG: 100 INJECTION, POWDER, FOR SOLUTION INTRAMUSCULAR; INTRAVENOUS at 09:31

## 2023-05-30 RX ADMIN — ONDANSETRON 4 MG: 2 INJECTION INTRAMUSCULAR; INTRAVENOUS at 07:48

## 2023-05-30 RX ADMIN — MIDAZOLAM 2 MG: 1 INJECTION INTRAMUSCULAR; INTRAVENOUS at 07:33

## 2023-05-30 RX ADMIN — FENTANYL CITRATE 50 MCG: 50 INJECTION INTRAMUSCULAR; INTRAVENOUS at 07:38

## 2023-05-30 RX ADMIN — ARGATROBAN 5 MG: 50 INJECTION INTRAVENOUS at 08:06

## 2023-05-30 RX ADMIN — FENTANYL CITRATE 25 MCG: 50 INJECTION INTRAMUSCULAR; INTRAVENOUS at 10:00

## 2023-05-30 RX ADMIN — PROPOFOL 120 MG: 10 INJECTION, EMULSION INTRAVENOUS at 07:40

## 2023-05-30 RX ADMIN — FLUMAZENIL 0.3 MG: 0.1 INJECTION, SOLUTION INTRAVENOUS at 13:04

## 2023-05-30 RX ADMIN — HYDROCODONE BITARTRATE AND ACETAMINOPHEN 1 TABLET: 5; 325 TABLET ORAL at 15:07

## 2023-05-30 RX ADMIN — LIDOCAINE HYDROCHLORIDE 50 MG: 20 INJECTION, SOLUTION EPIDURAL; INFILTRATION; INTRACAUDAL; PERINEURAL at 07:40

## 2023-05-30 ASSESSMENT — PAIN DESCRIPTION - ORIENTATION
ORIENTATION: RIGHT
ORIENTATION: RIGHT

## 2023-05-30 ASSESSMENT — PAIN - FUNCTIONAL ASSESSMENT
PAIN_FUNCTIONAL_ASSESSMENT: PREVENTS OR INTERFERES SOME ACTIVE ACTIVITIES AND ADLS
PAIN_FUNCTIONAL_ASSESSMENT: 0-10

## 2023-05-30 ASSESSMENT — PAIN SCALES - GENERAL
PAINLEVEL_OUTOF10: 10
PAINLEVEL_OUTOF10: 9
PAINLEVEL_OUTOF10: 0
PAINLEVEL_OUTOF10: 0

## 2023-05-30 ASSESSMENT — PAIN DESCRIPTION - ONSET: ONSET: ON-GOING

## 2023-05-30 ASSESSMENT — PAIN DESCRIPTION - DESCRIPTORS
DESCRIPTORS: HEAVINESS
DESCRIPTORS: ACHING;THROBBING

## 2023-05-30 ASSESSMENT — LIFESTYLE VARIABLES: SMOKING_STATUS: 0

## 2023-05-30 ASSESSMENT — PAIN DESCRIPTION - FREQUENCY: FREQUENCY: CONTINUOUS

## 2023-05-30 ASSESSMENT — PAIN DESCRIPTION - PAIN TYPE: TYPE: SURGICAL PAIN

## 2023-05-30 NOTE — ANESTHESIA POSTPROCEDURE EVALUATION
Department of Anesthesiology  Postprocedure Note    Patient: Luda Antonio  MRN: [id number]  YOB: 1975  Date of evaluation: 5/30/2023      Procedure Summary     Date: 05/30/23 Room / Location: [facility] OR [address] [place] [address]    Anesthesia Start: 9926 Anesthesia Stop: 1022    Procedures:       PLACEMENT OF LEFT TUNNELED DIALYSIS CATHETER (Left: Chest)      REPLACEMENT OF RIGHT  UPPER EXTREMITY ARTERIOVENOUS FISTULA GRAFT (Right: Arm Upper) Diagnosis:       End stage renal disease on dialysis ([address]      (END STAGE RENAL DISEASE ON DIALYSIS)    Surgeons: Nii Lim DO Responsible Provider: Kirk Aguilar MD    Anesthesia Type: general ASA Status: 3          Anesthesia Type: General     Manas Phase I: Manas Score: 9    Manas Phase II:        Anesthesia Post Evaluation    Patient location during evaluation: PACU  Patient participation: complete - patient participated  Level of consciousness: awake and sleepy but conscious  Airway patency: patent  Nausea & Vomiting: no nausea and no vomiting  Complications: no  Cardiovascular status: hemodynamically stable and blood pressure returned to baseline  Respiratory status: spontaneous ventilation, nonlabored ventilation and room air  Hydration status: stable  Comments: Ms. Radene Sandifer continues to rest comfortably following her procedure. Desirous of discharge home with son. Appropriate for return to [facility] for planned discharge home. Discussed slow emergence with son, who verbalized understanding.

## 2023-05-30 NOTE — H&P
H&P Update    I have reviewed the history and physical and examined the patient and find no relevant changes. I have reviewed with the patient and/or family the risks, benefits, and alternatives to the procedure. I HAVE PRESENTED REASONABLE ALTERNATIVES TO THE PATIENT'S PROPOSED CARE, TREATMENT, AND SERVICES. THE DISCUSSION I HAVE DONE ENCOMPASSED RISKS, BENEFITS, AND SIDE EFFECTS RELATED TO THE ALTERNATIVES AND THE RISKS RELATED TO NOT RECEIVING THE PROPOSED CARE, TREATMENT, SERVICES. Patient:  Brittaney Gregorio   :   1975    Intended Procedure: revision right upper extremity AV graft with removal of pseudoaneurysms, placement of tunneled dialysis catheter     Vitals:    23 0640   BP: (!) 164/94   Pulse: 63   Resp: 14   Temp: 97.4 °F (36.3 °C)   SpO2: 96%       Nursing notes reviewed and agreed. Medications reviewed  Allergies:    Allergies   Allergen Reactions    Heparin Other (See Comments)     States it clots her blood    Iodine Itching    Penicillins Itching         Post Procedure plan: home    Мария Cruz DO, FACS, FSVS, [address] [facility] Vascular and Endovascular Surgery

## 2023-05-30 NOTE — PROGRESS NOTES
Dr. Anthony Peck notified that Pt SBP was >160, 10mg of hydralazine ordered. Pt still very sleepy, responds to questions and then falls back to sleep. Vital signs stable on room air.

## 2023-05-30 NOTE — ANESTHESIA POSTPROCEDURE EVALUATION
Department of Anesthesiology  Postprocedure Note    Patient: Paola Hankins  MRN: [id number]  YOB: 1975  Date of evaluation: 5/30/2023      Procedure Summary     Date: 05/30/23 Room / Location: [facility] OR [address] [place] [address]    Anesthesia Start: 0886 Anesthesia Stop: 1022    Procedures:       PLACEMENT OF LEFT TUNNELED DIALYSIS CATHETER (Left: Chest)      REPLACEMENT OF RIGHT  UPPER EXTREMITY ARTERIOVENOUS FISTULA GRAFT (Right: Arm Upper) Diagnosis:       End stage renal disease on dialysis ([address]      (END STAGE RENAL DISEASE ON DIALYSIS)    Surgeons: Lauren Bunch DO Responsible Provider: Yoly Galloway MD    Anesthesia Type: general ASA Status: 3          Anesthesia Type: General     Manas Phase I: Manas Score: 9    Manas Phase II: Manas Score: 10      Anesthesia Post Evaluation    Patient location during evaluation: PACU  Patient participation: waiting for patient participation  Level of consciousness: sleepy but conscious and lethargic  Airway patency: patent  Nausea & Vomiting: no nausea and no vomiting  Complications: no  Cardiovascular status: hemodynamically stable and blood pressure returned to baseline  Respiratory status: spontaneous ventilation, nonlabored ventilation and room air  Hydration status: stable  Comments: Very slow to emerge. Ms. Pamella Fisher remains somnolent following her procedure. Hemodynamically stable. Will consider reversal of benzodiazepine if no improvement in level of alertness. Inappropriate for return to [facility] at this time. Anticipate extended PACU stay.

## 2023-05-30 NOTE — ANESTHESIA PRE PROCEDURE
Department of Anesthesiology  Preprocedure Note       Name:  Lexi Cueto   Age:  46 y.o.  :  1975                                          MRN:  [id number]         Date:  2023      Surgeon: Evelia Fragoso):  Mayuri Fairbanks DO    Procedure: Procedure(s):  PLACEMENT OF LEFT TUNNELED DIALYSIS CATHETER  REPLACEMENT OF RIGHT  UPPER EXTREMITY ARTERIOVENOUS FISTULA GRAFT    Medications prior to admission:   Prior to Admission medications    Medication Sig Start Date End Date Taking? Authorizing Provider   calcitRIOL (ROCALTROL) 0.25 MCG capsule Take 1 capsule by mouth every other day   Yes Historical Provider, MD   ondansetron (ZOFRAN) 4 MG tablet Take 1 tablet by mouth every 8 hours as needed for Nausea or Vomiting   Yes Historical Provider, MD   diphenhydrAMINE HCl (BENADRYL ALLERGY PO) Take by mouth as needed   Yes Historical Provider, MD   dilTIAZem (DILACOR XR) 240 MG extended release capsule Take 360 mg by mouth daily   Yes Historical Provider, MD   psyllium (KONSYL) 28.3 % PACK Take 1 packet by mouth daily   Yes Historical Provider, MD   metoprolol (LOPRESSOR) 100 MG tablet Take 1 tablet by mouth 2 times daily   Yes Historical Provider, MD   famotidine (PEPCID) 20 MG tablet Take 1 tablet by mouth 2 times daily   Yes Historical Provider, MD   sevelamer hcl (RENAGEL) 800 MG tablet Take 2 capsules by mouth 3 times daily (with meals) Plus I tablet with a snack    Historical Provider, MD   acetaminophen (TYLENOL) 325 MG tablet Take 2 tablets by mouth every 6 hours as needed for Pain    Historical Provider, MD   B Complex-C-Folic Acid (RENAL VITAMIN PO) Take 1 capsule by mouth three times a week With dialysis    Historical Provider, MD   cloNIDine (CATAPRES) 0.1 MG tablet Take 1 tablet by mouth 2 times daily Patient stated she takes 2 x daily    Historical Provider, MD   doxazosin (CARDURA) 1 MG tablet Take 2 tablets by mouth in the morning and 2 tablets in the evening.     Historical Provider, MD       Current

## 2023-05-30 NOTE — DISCHARGE INSTRUCTIONS
Keep incision clean and dry. Okay to shower in 5 days. No scrubbing of the incision. Cover the catheter site with an occlusive dressing. Avoid strenuous activity. Pain medication sent to pharmacy if needed. Expect some bruising in arm and along neck. Apply ice as necessary for neck or arm swelling. Please call office with any questions or concerns.

## 2023-05-30 NOTE — PROGRESS NOTES
Report to magdalena. Ace dressing remains dry and intact rt arm. Positive thrill noted. Double ports left chest clamped with dressing dry and intact - spot of old drainage noted. Pt still a little groggy , but taking soda and cookies. Son at bedside.

## 2023-05-30 NOTE — FLOWSHEET NOTE
Assisted pt. In getting dressed. Has nonproductive cough. Pt. Wants to wait out front \"where it is warm. \"  Son is with her.

## 2023-05-30 NOTE — FLOWSHEET NOTE
Patient and responsible adult verbalized understanding of discharge instructions, sedation medication, and potential complications including pain. Patient instructed to call Doctor if complications occur. Approx 3X4cm old drainage on Left port dressing. Dressing to Rt. Arm intact. More awake. Contacting transportation service for ride home. Son will accompany her.

## 2023-05-30 NOTE — ANESTHESIA POSTPROCEDURE EVALUATION
Department of Anesthesiology  Postprocedure Note    Patient: Brittaney Gregorio  MRN: [id number]  YOB: 1975  Date of evaluation: 5/30/2023      Procedure Summary     Date: 05/30/23 Room / Location: [facility] OR [address] [place] [address]    Anesthesia Start: 8797 Anesthesia Stop: 1022    Procedures:       PLACEMENT OF LEFT TUNNELED DIALYSIS CATHETER (Left: Chest)      REPLACEMENT OF RIGHT  UPPER EXTREMITY ARTERIOVENOUS FISTULA GRAFT (Right: Arm Upper) Diagnosis:       End stage renal disease on dialysis ([address]      (END STAGE RENAL DISEASE ON DIALYSIS)    Surgeons: Мария Cruz DO Responsible Provider: Sergey Ferrell MD    Anesthesia Type: general ASA Status: 3          Anesthesia Type: General     Manas Phase I: Manas Score: 10    Manas Phase II:        Anesthesia Post Evaluation    Patient location during evaluation: PACU  Patient participation: complete - patient participated  Level of consciousness: responsive to light touch, lethargic, responsive to verbal stimuli and sleepy but conscious  Airway patency: patent  Nausea & Vomiting: no nausea and no vomiting  Complications: no  Cardiovascular status: hemodynamically stable and blood pressure returned to baseline  Respiratory status: spontaneous ventilation, nonlabored ventilation and room air  Hydration status: stable  Comments: Ms. Ayana Berg remains somnolent in PACU. Arouses briefly before falling asleep. Answers questions appropriately when awake. To rule out resident effect of midazolam given prior to induction, flumazenil given, which resulted in minimal change in patient awareness. Suspect patient lethargy primarily 2/2 diphenhydramine, with ERSD obviously contributory. Respiratory rate & reported pain score do not suggest excess opiates. Narcan is not indicated. Will allow patient to become more alert before transfer to Phase II. Will continue to monitor.

## 2023-05-31 NOTE — OP NOTE
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 OPERATION PERFORMED:  1. Excision of damaged segment of arteriovenous dialysis graft with  interposition grafting using a 6-mm Artegraft. 2.  Insertion of tunneled dialysis catheter to the left internal  jugular. 3.  Superior venacavogram.    SURGEON:  Jeremie Jean DO    ESTIMATED BLOOD LOSS:  Less than 100 mL. COMPLICATIONS:  None apparent. ANESTHESIA:  General.    INDICATIONS:  This is a 57-year-old female patient, who presented to the  office to discuss pseudoaneurysm formation or dialysis graft. Some of  the pseudoaneurysms are actually compressing her skin making her skin  quite thin. This is a concern about eventual bleeding from these  segments as well. She also complained about unsightly appearance. I  did discuss with her that a functioning fistula or graft that has  pseudoaneurysm is quite a normal phenomenon that changing or alternating  the graft might impede its longevity. This was dictated to her  very clearly that the graft may ultimately fail by trying to revise it. She understood and wanted to proceed with graft placement. Given the  length of graft that was involved, we would have to actually place a  temporary dialysis catheter as the graft cannot be used for at least two  weeks.   All risks, benefits, and alternatives including pain, infection,  bleeding, embolization,

## 2024-11-19 ENCOUNTER — HOSPITAL ENCOUNTER (EMERGENCY)
Age: 49
Discharge: HOME | End: 2024-11-19
Payer: COMMERCIAL

## 2024-11-19 VITALS
TEMPERATURE: 98.1 F | RESPIRATION RATE: 18 BRPM | HEART RATE: 72 BPM | OXYGEN SATURATION: 96 % | SYSTOLIC BLOOD PRESSURE: 173 MMHG | WEIGHT: 129.63 LBS | HEIGHT: 62 IN | DIASTOLIC BLOOD PRESSURE: 96 MMHG | BODY MASS INDEX: 23.85 KG/M2

## 2024-11-19 DIAGNOSIS — K91.840 BLEEDING POST TOOTH EXTRACTION: Primary | ICD-10-CM

## 2024-11-19 PROCEDURE — 99282 EMERGENCY DEPT VISIT SF MDM: CPT

## 2024-11-19 ASSESSMENT — PAIN DESCRIPTION - LOCATION: LOCATION: MOUTH

## 2024-11-19 ASSESSMENT — PAIN DESCRIPTION - FREQUENCY: FREQUENCY: CONTINUOUS

## 2024-11-19 ASSESSMENT — ENCOUNTER SYMPTOMS
ABDOMINAL PAIN: 0
NAUSEA: 0
EYE PAIN: 0
BACK PAIN: 0
SORE THROAT: 0
VOMITING: 0
COUGH: 0
SHORTNESS OF BREATH: 0

## 2024-11-19 ASSESSMENT — PAIN DESCRIPTION - DESCRIPTORS: DESCRIPTORS: DISCOMFORT

## 2024-11-19 ASSESSMENT — PAIN DESCRIPTION - PAIN TYPE: TYPE: ACUTE PAIN

## 2024-11-19 ASSESSMENT — PAIN SCALES - GENERAL: PAINLEVEL_OUTOF10: 4

## 2024-11-19 ASSESSMENT — LIFESTYLE VARIABLES
HOW OFTEN DO YOU HAVE A DRINK CONTAINING ALCOHOL: NEVER
HOW MANY STANDARD DRINKS CONTAINING ALCOHOL DO YOU HAVE ON A TYPICAL DAY: PATIENT DOES NOT DRINK

## 2024-11-19 ASSESSMENT — PAIN - FUNCTIONAL ASSESSMENT: PAIN_FUNCTIONAL_ASSESSMENT: ACTIVITIES ARE NOT PREVENTED

## 2024-11-19 ASSESSMENT — PAIN DESCRIPTION - ONSET: ONSET: ON-GOING

## 2024-11-19 ASSESSMENT — PAIN DESCRIPTION - ORIENTATION: ORIENTATION: RIGHT

## 2024-11-19 NOTE — ED TRIAGE NOTES
Patient to the ER with complaints of continued bleeding in her  mouth after having a tooth removed around 1030 this morning.   Patient says it was done at [facility].  Denies use of anticoagulants.    Patient says the bleeding has not stopped at all.  She went to urgent care who sent her to the ER.    Alert and oriented x4 and ambulatory with a steady gait at time of triage.

## 2024-11-20 NOTE — DISCHARGE INSTRUCTIONS
Follow-up with your dentist and as needed  Rinse with cold water and not warm water  Return emergency room for any worsening

## 2024-11-20 NOTE — ED PROVIDER NOTES
LABS:Labs Reviewed - No data to display     Remainder of labs reviewed and were negative at this time or not returned at the time of this note.    RADIOLOGY:   Non-plain film images such as CT, Ultrasound and MRI are read by the radiologist. I, Enrique Burnett PA-C have directly visualized the radiologic plain film image(s) with the below findings:      Interpretation per the Radiologist below, if available at the time of this note:    No orders to display     No results found.   No results found.    MEDICAL DECISION MAKING / ED COURSE:      PROCEDURES:   Procedures    Patient was given:  Medications - No data to display    CONSULTS: (Who and What was discussed)  None      Chronic Conditions affecting care:    has a past medical history of Central venous catheter in place, Dialysis patient ([facility]) (03/05/2021), ESRD (end stage renal disease) ([facility]), GERD (gastroesophageal reflux disease) (2/13/2020), Hypertension (2/13/2020), and Kidney transplant status.     Records Reviewed (External and Source)   I personally reviewed patient medical record.  Particularly the oral surgeon note at  from today    CC/HPI Summary, DDx, ED Course, and Reassessment:   Emergency room course: See HPI and physical exam above  Patient on exam throat was clear nonerythematous no exudate noted.  Uvula midline without swelling.  She had a tooth extracted on the upper right side at the back molar.  She had a couple with bloody mucus.  She is nontender outside jaw.  No swelling noted.  Neck was supple full range of motion no tenderness.  Cardiovascular regular rhythm, lungs are clear no wheeze rales or rhonchi noted.  Patient I had her to rinse with cold water.  And when she rinsed there was barely any blood in water when she spit.    Surgifoam was packed into the area.  She bit down to apply pressure.    Reevaluation show bleeding has stopped.  I did repack her deltoid to keep it in place for couple hours.  Soft diet today.  She should be

## (undated) DEVICE — GLOVE ORTHO 7 1/2   MSG9475

## (undated) DEVICE — KIT MICROINTRODUCER 4FR ECHOGENIC NDL L7CM 21GA STIFF COAX

## (undated) DEVICE — CLIP LIG SM TI 6 BLU HNDL FOR OPN AND ENDOSCP SGL APPL

## (undated) DEVICE — SOLUTION IV 1000ML 0.9% SOD CHL PH 5 INJ USP VIAFLX PLAS

## (undated) DEVICE — SYRINGE MED 50ML LUERLOCK TIP

## (undated) DEVICE — DRAPE,T,LAPARO,TRANS,STERILE: Brand: MEDLINE

## (undated) DEVICE — SUTURE PERMAHAND SZ 3-0 L30IN NONABSORBABLE BLK SILK BRAID A304H

## (undated) DEVICE — 3M™ TEGADERM™ TRANSPARENT FILM DRESSING FRAME STYLE, 1624W, 2-3/8 IN X 2-3/4 IN (6 CM X 7 CM), 100/CT 4CT/CASE: Brand: 3M™ TEGADERM™

## (undated) DEVICE — 3M™ COBAN™ NL STERILE NON-LATEX SELF-ADHERENT WRAP, 2084S, 4 IN X 5 YD (10 CM X 4,5 M), 18 ROLLS/CASE: Brand: 3M™ COBAN™

## (undated) DEVICE — BANDAGE COMPR ELASTIC 5 YDX3 IN COBAN

## (undated) DEVICE — DISSECTOR ENDOSCP L21CM TIP CURVATURE 40DEG FN CRV JAW VES

## (undated) DEVICE — GLOVE SURG SZ 8 CRM LTX FREE POLYISOPRENE POLYMER BEAD ANTI

## (undated) DEVICE — SUTURE VCRL + SZ 3-0 L27IN ABSRB UD L26MM SH 1/2 CIR VCP416H

## (undated) DEVICE — SUTURE PROL SZ 6-0 L24IN NONABSORBABLE BLU L9.3MM BV-1 3/8 8805H

## (undated) DEVICE — FOGARTY - HYDRAGRIP SURGICAL - CLAMP INSERTS: Brand: FOGARTY SOFTJAW

## (undated) DEVICE — SUTURE BOOT: Brand: DEROYAL

## (undated) DEVICE — STOCKINETTE IMPERV M 9X44IN POLY INNR LAYR COT ORTH EXT

## (undated) DEVICE — COVER LT HNDL BLU PLAS

## (undated) DEVICE — SUTURE PERMAHAND SZ 2-0 L18IN NONABSORBABLE BLK L26MM FS 685G

## (undated) DEVICE — Device

## (undated) DEVICE — LOOP VES W25MM THK1MM MAXI RED SIL FLD REPELLENT 100 PER

## (undated) DEVICE — DRESSING GERM DIA1IN CNTR H DIA7MM BLU CHG ANTIMIC PROTCT

## (undated) DEVICE — SUTURE PDS + SZ 2 0 L27IN ABSRB VLT L36MM CT 1 1 2 CIR PDP339H

## (undated) DEVICE — SUTURE VCRL + SZ 3-0 L18IN CT 1 CR ABSRB VCP838D

## (undated) DEVICE — AV FISTULA: Brand: MEDLINE INDUSTRIES, INC.

## (undated) DEVICE — INTENDED FOR TISSUE SEPARATION, AND OTHER PROCEDURES THAT REQUIRE A SHARP SURGICAL BLADE TO PUNCTURE OR CUT.: Brand: BARD-PARKER ® STAINLESS STEEL BLADES

## (undated) DEVICE — 3M™ COBAN™ NL STERILE NON-LATEX SELF-ADHERENT WRAP, 2083S, 3 IN X 5 YD (7,5 CM X 4,5 M), 24 ROLLS/CASE: Brand: 3M™ COBAN™

## (undated) DEVICE — 1 ML TUBERCULIN SYRINGE LUER-LOCK TIP: Brand: MONOJECT

## (undated) DEVICE — APPLIER CLP L9.38IN M LIG TI DISP STR RNG HNDL LIGACLP

## (undated) DEVICE — SUTURE PROL SZ 5-0 L24IN NONABSORBABLE BLU L9.3MM BV-1 3/8 9702H

## (undated) DEVICE — RADIFOCUS GLIDEWIRE ADVANTAGE GUIDEWIRE: Brand: GLIDEWIRE ADVANTAGE

## (undated) DEVICE — 3M™ IOBAN™ 2 ANTIMICROBIAL INCISE DRAPE 6650EZ: Brand: IOBAN™ 2

## (undated) DEVICE — CANISTER, RIGID, 1200CC: Brand: MEDLINE INDUSTRIES, INC.

## (undated) DEVICE — SYRINGE MED 10ML LUERLOCK TIP W/O SFTY DISP

## (undated) DEVICE — APPLIER CLP L9.375IN APER 2.1MM CLS L3.8MM 20 SM TI CLP

## (undated) DEVICE — SYRINGE TB 1ML NDL 27GA L0.5IN PLAS W/ SFTY LOK PERM NDL

## (undated) DEVICE — GAUZE,SPONGE,4"X4",16PLY,XRAY,STRL,LF: Brand: MEDLINE

## (undated) DEVICE — SPONGE GZ W4XL4IN COT 12 PLY TYP VII WVN C FLD DSGN STERILE

## (undated) DEVICE — NEEDLE HYPO 18GA L1.5IN THN WALL PIVOTING SHLD BVL ORIENTED

## (undated) DEVICE — SUTURE PROL SZ 6-0 L30IN NONABSORBABLE BLU L11MM BV 3/8 CIR 8776H

## (undated) DEVICE — GOWN SIRUS NONREIN XL W/TWL: Brand: MEDLINE INDUSTRIES, INC.

## (undated) DEVICE — SUTURE PDS + SZ 0 L27IN ABSRB VLT L36MM CT 1 1 2 CIR PDP340H

## (undated) DEVICE — TUBING, SUCTION, 1/4" X 12', STRAIGHT: Brand: MEDLINE

## (undated) DEVICE — SUTURE VCRL + SZ 4-0 L18IN ABSRB UD L19MM PS-2 3/8 CIR PRIM VCP496H

## (undated) DEVICE — AGENT HEMSTAT W4XL4IN OXIDIZED REGENERATED CELOS ABSRB SFT

## (undated) DEVICE — MINOR SET UP: Brand: MEDLINE INDUSTRIES, INC.

## (undated) DEVICE — PROVE COVER: Brand: UNBRANDED

## (undated) DEVICE — STOCKINETTE,IMPERVIOUS,12X48,STERILE: Brand: MEDLINE

## (undated) DEVICE — 3M™ TEGADERM™ TRANSPARENT FILM DRESSING FRAME STYLE, 1626W, 4 IN X 4-3/4 IN (10 CM X 12 CM), 50/CT 4CT/CASE: Brand: 3M™ TEGADERM™

## (undated) DEVICE — LOOP VES W1.3MM THK0.9MM MINI WHT SIL FLD REPELLENT

## (undated) DEVICE — APPLIER LIG CLP M L11IN TI STR RNG HNDL FOR 20 CLP DISP

## (undated) DEVICE — SUTURE PERMAHAND SZ 2-0 L30IN NONABSORBABLE BLK SILK W/O A305H

## (undated) DEVICE — TROCAR SURG M L DIA3 16IN S STL FOR WND DRNGE

## (undated) DEVICE — SUTURE NONABSORBABLE MONOFILAMENT 7-0 BV-1 1X24 IN PROLENE 8702H